# Patient Record
Sex: FEMALE | Race: WHITE | NOT HISPANIC OR LATINO | Employment: OTHER | URBAN - METROPOLITAN AREA
[De-identification: names, ages, dates, MRNs, and addresses within clinical notes are randomized per-mention and may not be internally consistent; named-entity substitution may affect disease eponyms.]

---

## 2023-10-03 ENCOUNTER — APPOINTMENT (EMERGENCY)
Dept: RADIOLOGY | Facility: HOSPITAL | Age: 73
End: 2023-10-03
Payer: MEDICARE

## 2023-10-03 ENCOUNTER — HOSPITAL ENCOUNTER (OUTPATIENT)
Facility: HOSPITAL | Age: 73
Setting detail: OBSERVATION
Discharge: HOME | End: 2023-10-04
Attending: EMERGENCY MEDICINE | Admitting: INTERNAL MEDICINE
Payer: MEDICARE

## 2023-10-03 DIAGNOSIS — J45.901 ASTHMA EXACERBATION: Primary | ICD-10-CM

## 2023-10-03 LAB
ALBUMIN SERPL BCP-MCNC: 3.4 G/DL (ref 3.5–5)
ALP SERPL-CCNC: 70 U/L (ref 34–104)
ALT SERPL W P-5'-P-CCNC: 16 U/L (ref 7–52)
ANION GAP SERPL CALCULATED.3IONS-SCNC: 8 MMOL/L
AST SERPL W P-5'-P-CCNC: 16 U/L (ref 13–39)
BASOPHILS # BLD AUTO: 0.04 THOUSANDS/ÂΜL (ref 0–0.1)
BASOPHILS NFR BLD AUTO: 1 % (ref 0–1)
BILIRUB SERPL-MCNC: 0.6 MG/DL (ref 0.2–1)
BUN SERPL-MCNC: 13 MG/DL (ref 5–25)
CALCIUM ALBUM COR SERPL-MCNC: 9.1 MG/DL (ref 8.3–10.1)
CALCIUM SERPL-MCNC: 8.6 MG/DL (ref 8.4–10.2)
CARDIAC TROPONIN I PNL SERPL HS: 7 NG/L
CHLORIDE SERPL-SCNC: 98 MMOL/L (ref 96–108)
CO2 SERPL-SCNC: 27 MMOL/L (ref 21–32)
CREAT SERPL-MCNC: 0.81 MG/DL (ref 0.6–1.3)
EOSINOPHIL # BLD AUTO: 0.22 THOUSAND/ÂΜL (ref 0–0.61)
EOSINOPHIL NFR BLD AUTO: 3 % (ref 0–6)
ERYTHROCYTE [DISTWIDTH] IN BLOOD BY AUTOMATED COUNT: 13.1 % (ref 11.6–15.1)
GFR SERPL CREATININE-BSD FRML MDRD: 72 ML/MIN/1.73SQ M
GLUCOSE SERPL-MCNC: 120 MG/DL (ref 65–140)
HCT VFR BLD AUTO: 33.3 % (ref 34.8–46.1)
HGB BLD-MCNC: 10.8 G/DL (ref 11.5–15.4)
IMM GRANULOCYTES # BLD AUTO: 0.07 THOUSAND/UL (ref 0–0.2)
IMM GRANULOCYTES NFR BLD AUTO: 1 % (ref 0–2)
LYMPHOCYTES # BLD AUTO: 1.29 THOUSANDS/ÂΜL (ref 0.6–4.47)
LYMPHOCYTES NFR BLD AUTO: 16 % (ref 14–44)
MCH RBC QN AUTO: 29.6 PG (ref 26.8–34.3)
MCHC RBC AUTO-ENTMCNC: 32.4 G/DL (ref 31.4–37.4)
MCV RBC AUTO: 91 FL (ref 82–98)
MONOCYTES # BLD AUTO: 0.69 THOUSAND/ÂΜL (ref 0.17–1.22)
MONOCYTES NFR BLD AUTO: 9 % (ref 4–12)
NEUTROPHILS # BLD AUTO: 5.6 THOUSANDS/ÂΜL (ref 1.85–7.62)
NEUTS SEG NFR BLD AUTO: 70 % (ref 43–75)
NRBC BLD AUTO-RTO: 0 /100 WBCS
PLATELET # BLD AUTO: 201 THOUSANDS/UL (ref 149–390)
PMV BLD AUTO: 9.6 FL (ref 8.9–12.7)
POTASSIUM SERPL-SCNC: 3.1 MMOL/L (ref 3.5–5.3)
PROT SERPL-MCNC: 6.6 G/DL (ref 6.4–8.4)
RBC # BLD AUTO: 3.65 MILLION/UL (ref 3.81–5.12)
SODIUM SERPL-SCNC: 133 MMOL/L (ref 135–147)
WBC # BLD AUTO: 7.91 THOUSAND/UL (ref 4.31–10.16)

## 2023-10-03 PROCEDURE — 36415 COLL VENOUS BLD VENIPUNCTURE: CPT | Performed by: EMERGENCY MEDICINE

## 2023-10-03 PROCEDURE — 85025 COMPLETE CBC W/AUTO DIFF WBC: CPT | Performed by: EMERGENCY MEDICINE

## 2023-10-03 PROCEDURE — 94644 CONT INHLJ TX 1ST HOUR: CPT

## 2023-10-03 PROCEDURE — 93005 ELECTROCARDIOGRAM TRACING: CPT

## 2023-10-03 PROCEDURE — 84484 ASSAY OF TROPONIN QUANT: CPT | Performed by: EMERGENCY MEDICINE

## 2023-10-03 PROCEDURE — 99285 EMERGENCY DEPT VISIT HI MDM: CPT

## 2023-10-03 PROCEDURE — 83735 ASSAY OF MAGNESIUM: CPT

## 2023-10-03 PROCEDURE — 99285 EMERGENCY DEPT VISIT HI MDM: CPT | Performed by: PHYSICIAN ASSISTANT

## 2023-10-03 PROCEDURE — 96375 TX/PRO/DX INJ NEW DRUG ADDON: CPT

## 2023-10-03 PROCEDURE — 80053 COMPREHEN METABOLIC PANEL: CPT | Performed by: EMERGENCY MEDICINE

## 2023-10-03 PROCEDURE — 71045 X-RAY EXAM CHEST 1 VIEW: CPT

## 2023-10-03 PROCEDURE — 96365 THER/PROPH/DIAG IV INF INIT: CPT

## 2023-10-03 RX ORDER — METHYLPREDNISOLONE SODIUM SUCCINATE 125 MG/2ML
125 INJECTION, POWDER, LYOPHILIZED, FOR SOLUTION INTRAMUSCULAR; INTRAVENOUS ONCE
Status: COMPLETED | OUTPATIENT
Start: 2023-10-03 | End: 2023-10-03

## 2023-10-03 RX ORDER — MAGNESIUM SULFATE HEPTAHYDRATE 40 MG/ML
2 INJECTION, SOLUTION INTRAVENOUS ONCE
Status: COMPLETED | OUTPATIENT
Start: 2023-10-03 | End: 2023-10-04

## 2023-10-03 RX ORDER — SODIUM CHLORIDE FOR INHALATION 0.9 %
3 VIAL, NEBULIZER (ML) INHALATION ONCE
Status: COMPLETED | OUTPATIENT
Start: 2023-10-03 | End: 2023-10-03

## 2023-10-03 RX ORDER — POTASSIUM CHLORIDE 20 MEQ/1
40 TABLET, EXTENDED RELEASE ORAL ONCE
Status: COMPLETED | OUTPATIENT
Start: 2023-10-03 | End: 2023-10-03

## 2023-10-03 RX ADMIN — MAGNESIUM SULFATE HEPTAHYDRATE 2 G: 40 INJECTION, SOLUTION INTRAVENOUS at 22:58

## 2023-10-03 RX ADMIN — ALBUTEROL SULFATE 10 MG: 2.5 SOLUTION RESPIRATORY (INHALATION) at 22:42

## 2023-10-03 RX ADMIN — Medication 3 ML: at 22:42

## 2023-10-03 RX ADMIN — METHYLPREDNISOLONE SODIUM SUCCINATE 125 MG: 125 INJECTION, POWDER, FOR SOLUTION INTRAMUSCULAR; INTRAVENOUS at 22:45

## 2023-10-03 RX ADMIN — POTASSIUM CHLORIDE 40 MEQ: 1500 TABLET, EXTENDED RELEASE ORAL at 22:40

## 2023-10-03 RX ADMIN — IPRATROPIUM BROMIDE 0.5 MG: 0.5 SOLUTION RESPIRATORY (INHALATION) at 22:42

## 2023-10-04 VITALS
BODY MASS INDEX: 45.84 KG/M2 | HEART RATE: 95 BPM | RESPIRATION RATE: 17 BRPM | SYSTOLIC BLOOD PRESSURE: 137 MMHG | DIASTOLIC BLOOD PRESSURE: 63 MMHG | TEMPERATURE: 97.9 F | OXYGEN SATURATION: 94 % | HEIGHT: 62 IN | WEIGHT: 249.12 LBS

## 2023-10-04 PROBLEM — G25.81 RLS (RESTLESS LEGS SYNDROME): Status: ACTIVE | Noted: 2023-10-04

## 2023-10-04 PROBLEM — Z59.00 HOMELESSNESS: Status: ACTIVE | Noted: 2023-10-04

## 2023-10-04 PROBLEM — J96.01 ACUTE RESPIRATORY FAILURE WITH HYPOXIA (HCC): Status: ACTIVE | Noted: 2023-10-04

## 2023-10-04 PROBLEM — E87.6 HYPOKALEMIA: Status: ACTIVE | Noted: 2023-10-04

## 2023-10-04 PROBLEM — J98.4 ACUTE PULMONARY INSUFFICIENCY: Status: ACTIVE | Noted: 2023-10-04

## 2023-10-04 LAB
2HR DELTA HS TROPONIN: -1 NG/L
4HR DELTA HS TROPONIN: 0 NG/L
ANION GAP SERPL CALCULATED.3IONS-SCNC: 11 MMOL/L
ATRIAL RATE: 89 BPM
ATRIAL RATE: 97 BPM
BUN SERPL-MCNC: 12 MG/DL (ref 5–25)
CALCIUM SERPL-MCNC: 8.7 MG/DL (ref 8.4–10.2)
CARDIAC TROPONIN I PNL SERPL HS: 6 NG/L
CARDIAC TROPONIN I PNL SERPL HS: 7 NG/L
CHLORIDE SERPL-SCNC: 100 MMOL/L (ref 96–108)
CO2 SERPL-SCNC: 25 MMOL/L (ref 21–32)
CREAT SERPL-MCNC: 0.81 MG/DL (ref 0.6–1.3)
ERYTHROCYTE [DISTWIDTH] IN BLOOD BY AUTOMATED COUNT: 13 % (ref 11.6–15.1)
FLUAV RNA RESP QL NAA+PROBE: NEGATIVE
FLUBV RNA RESP QL NAA+PROBE: NEGATIVE
GFR SERPL CREATININE-BSD FRML MDRD: 72 ML/MIN/1.73SQ M
GLUCOSE SERPL-MCNC: 174 MG/DL (ref 65–140)
GLUCOSE SERPL-MCNC: 187 MG/DL (ref 65–140)
GLUCOSE SERPL-MCNC: 195 MG/DL (ref 65–140)
GLUCOSE SERPL-MCNC: 205 MG/DL (ref 65–140)
HCT VFR BLD AUTO: 34.6 % (ref 34.8–46.1)
HGB BLD-MCNC: 11.1 G/DL (ref 11.5–15.4)
MAGNESIUM SERPL-MCNC: 1.9 MG/DL (ref 1.9–2.7)
MCH RBC QN AUTO: 29.5 PG (ref 26.8–34.3)
MCHC RBC AUTO-ENTMCNC: 32.1 G/DL (ref 31.4–37.4)
MCV RBC AUTO: 92 FL (ref 82–98)
P AXIS: 70 DEGREES
P AXIS: 71 DEGREES
PLATELET # BLD AUTO: 202 THOUSANDS/UL (ref 149–390)
PMV BLD AUTO: 9.9 FL (ref 8.9–12.7)
POTASSIUM SERPL-SCNC: 3.2 MMOL/L (ref 3.5–5.3)
PR INTERVAL: 128 MS
PR INTERVAL: 148 MS
QRS AXIS: 48 DEGREES
QRS AXIS: 61 DEGREES
QRSD INTERVAL: 88 MS
QRSD INTERVAL: 98 MS
QT INTERVAL: 366 MS
QT INTERVAL: 368 MS
QTC INTERVAL: 445 MS
QTC INTERVAL: 467 MS
RBC # BLD AUTO: 3.76 MILLION/UL (ref 3.81–5.12)
RSV RNA RESP QL NAA+PROBE: NEGATIVE
SARS-COV-2 RNA RESP QL NAA+PROBE: NEGATIVE
SODIUM SERPL-SCNC: 136 MMOL/L (ref 135–147)
T WAVE AXIS: 54 DEGREES
T WAVE AXIS: 64 DEGREES
VENTRICULAR RATE: 89 BPM
VENTRICULAR RATE: 97 BPM
WBC # BLD AUTO: 8.02 THOUSAND/UL (ref 4.31–10.16)

## 2023-10-04 PROCEDURE — 0241U HB NFCT DS VIR RESP RNA 4 TRGT: CPT

## 2023-10-04 PROCEDURE — 82948 REAGENT STRIP/BLOOD GLUCOSE: CPT

## 2023-10-04 PROCEDURE — G0008 ADMIN INFLUENZA VIRUS VAC: HCPCS | Performed by: STUDENT IN AN ORGANIZED HEALTH CARE EDUCATION/TRAINING PROGRAM

## 2023-10-04 PROCEDURE — 93005 ELECTROCARDIOGRAM TRACING: CPT

## 2023-10-04 PROCEDURE — 85027 COMPLETE CBC AUTOMATED: CPT | Performed by: STUDENT IN AN ORGANIZED HEALTH CARE EDUCATION/TRAINING PROGRAM

## 2023-10-04 PROCEDURE — 94760 N-INVAS EAR/PLS OXIMETRY 1: CPT

## 2023-10-04 PROCEDURE — 84484 ASSAY OF TROPONIN QUANT: CPT | Performed by: EMERGENCY MEDICINE

## 2023-10-04 PROCEDURE — 80048 BASIC METABOLIC PNL TOTAL CA: CPT | Performed by: STUDENT IN AN ORGANIZED HEALTH CARE EDUCATION/TRAINING PROGRAM

## 2023-10-04 PROCEDURE — 93010 ELECTROCARDIOGRAM REPORT: CPT | Performed by: INTERNAL MEDICINE

## 2023-10-04 PROCEDURE — 99236 HOSP IP/OBS SAME DATE HI 85: CPT | Performed by: INTERNAL MEDICINE

## 2023-10-04 PROCEDURE — 90662 IIV NO PRSV INCREASED AG IM: CPT | Performed by: STUDENT IN AN ORGANIZED HEALTH CARE EDUCATION/TRAINING PROGRAM

## 2023-10-04 PROCEDURE — 94640 AIRWAY INHALATION TREATMENT: CPT

## 2023-10-04 RX ORDER — TRAMADOL HYDROCHLORIDE 50 MG/1
50 TABLET ORAL 2 TIMES DAILY PRN
COMMUNITY
Start: 2023-08-18

## 2023-10-04 RX ORDER — VENLAFAXINE HYDROCHLORIDE 150 MG/1
1 CAPSULE, EXTENDED RELEASE ORAL DAILY
COMMUNITY
Start: 2023-06-15

## 2023-10-04 RX ORDER — GUAIFENESIN 100 MG/5ML
200 SOLUTION ORAL EVERY 4 HOURS PRN
Status: DISCONTINUED | OUTPATIENT
Start: 2023-10-04 | End: 2023-10-04 | Stop reason: HOSPADM

## 2023-10-04 RX ORDER — PANTOPRAZOLE SODIUM 20 MG/1
20 TABLET, DELAYED RELEASE ORAL 2 TIMES DAILY
COMMUNITY
Start: 2023-04-26 | End: 2024-04-25

## 2023-10-04 RX ORDER — ROPINIROLE 1 MG/1
1 TABLET, FILM COATED ORAL EVERY MORNING
Status: DISCONTINUED | OUTPATIENT
Start: 2023-10-04 | End: 2023-10-04 | Stop reason: HOSPADM

## 2023-10-04 RX ORDER — ALBUTEROL SULFATE 90 UG/1
2 AEROSOL, METERED RESPIRATORY (INHALATION) EVERY 6 HOURS PRN
Qty: 18 G | Refills: 0 | Status: SHIPPED | OUTPATIENT
Start: 2023-10-04

## 2023-10-04 RX ORDER — POTASSIUM CHLORIDE 20 MEQ/1
40 TABLET, EXTENDED RELEASE ORAL ONCE
Status: COMPLETED | OUTPATIENT
Start: 2023-10-04 | End: 2023-10-04

## 2023-10-04 RX ORDER — GUAIFENESIN AND CODEINE PHOSPHATE 100; 10 MG/5ML; MG/5ML
5 SOLUTION ORAL 3 TIMES DAILY PRN
Qty: 120 ML | Refills: 0 | Status: SHIPPED | OUTPATIENT
Start: 2023-10-04

## 2023-10-04 RX ORDER — ROPINIROLE 1 MG/1
TABLET, FILM COATED ORAL
COMMUNITY
Start: 2023-05-10

## 2023-10-04 RX ORDER — METHYLPREDNISOLONE SODIUM SUCCINATE 40 MG/ML
40 INJECTION, POWDER, LYOPHILIZED, FOR SOLUTION INTRAMUSCULAR; INTRAVENOUS EVERY 8 HOURS
Status: DISCONTINUED | OUTPATIENT
Start: 2023-10-04 | End: 2023-10-04 | Stop reason: HOSPADM

## 2023-10-04 RX ORDER — ENOXAPARIN SODIUM 100 MG/ML
40 INJECTION SUBCUTANEOUS DAILY
Status: DISCONTINUED | OUTPATIENT
Start: 2023-10-04 | End: 2023-10-04 | Stop reason: HOSPADM

## 2023-10-04 RX ORDER — PANTOPRAZOLE SODIUM 20 MG/1
20 TABLET, DELAYED RELEASE ORAL 2 TIMES DAILY
Status: DISCONTINUED | OUTPATIENT
Start: 2023-10-04 | End: 2023-10-04 | Stop reason: HOSPADM

## 2023-10-04 RX ORDER — LEVALBUTEROL INHALATION SOLUTION 1.25 MG/3ML
1.25 SOLUTION RESPIRATORY (INHALATION) EVERY 6 HOURS PRN
Status: DISCONTINUED | OUTPATIENT
Start: 2023-10-04 | End: 2023-10-04 | Stop reason: HOSPADM

## 2023-10-04 RX ORDER — HYDROCHLOROTHIAZIDE 12.5 MG/1
12.5 TABLET ORAL DAILY
Status: DISCONTINUED | OUTPATIENT
Start: 2023-10-04 | End: 2023-10-04 | Stop reason: HOSPADM

## 2023-10-04 RX ORDER — INSULIN LISPRO 100 [IU]/ML
1-6 INJECTION, SOLUTION INTRAVENOUS; SUBCUTANEOUS
Status: DISCONTINUED | OUTPATIENT
Start: 2023-10-04 | End: 2023-10-04 | Stop reason: HOSPADM

## 2023-10-04 RX ORDER — IPRATROPIUM BROMIDE AND ALBUTEROL SULFATE 2.5; .5 MG/3ML; MG/3ML
3 SOLUTION RESPIRATORY (INHALATION)
Status: DISCONTINUED | OUTPATIENT
Start: 2023-10-04 | End: 2023-10-04 | Stop reason: HOSPADM

## 2023-10-04 RX ORDER — SODIUM CHLORIDE FOR INHALATION 0.9 %
3 VIAL, NEBULIZER (ML) INHALATION EVERY 6 HOURS PRN
Status: DISCONTINUED | OUTPATIENT
Start: 2023-10-04 | End: 2023-10-04 | Stop reason: HOSPADM

## 2023-10-04 RX ORDER — MAGNESIUM HYDROXIDE/ALUMINUM HYDROXICE/SIMETHICONE 120; 1200; 1200 MG/30ML; MG/30ML; MG/30ML
30 SUSPENSION ORAL EVERY 6 HOURS PRN
Status: DISCONTINUED | OUTPATIENT
Start: 2023-10-04 | End: 2023-10-04 | Stop reason: HOSPADM

## 2023-10-04 RX ORDER — POLYETHYLENE GLYCOL 3350 17 G/17G
17 POWDER, FOR SOLUTION ORAL DAILY
Status: DISCONTINUED | OUTPATIENT
Start: 2023-10-04 | End: 2023-10-04 | Stop reason: HOSPADM

## 2023-10-04 RX ORDER — ALBUTEROL SULFATE 2.5 MG/3ML
2.5 SOLUTION RESPIRATORY (INHALATION) EVERY 4 HOURS PRN
COMMUNITY
Start: 2023-02-03 | End: 2024-02-03

## 2023-10-04 RX ORDER — HYDROCHLOROTHIAZIDE 12.5 MG/1
12.5 TABLET ORAL DAILY
COMMUNITY
Start: 2023-07-07

## 2023-10-04 RX ORDER — ACETAMINOPHEN 325 MG/1
650 TABLET ORAL EVERY 6 HOURS PRN
Status: DISCONTINUED | OUTPATIENT
Start: 2023-10-04 | End: 2023-10-04 | Stop reason: HOSPADM

## 2023-10-04 RX ORDER — CLONAZEPAM 0.5 MG/1
0.5 TABLET ORAL DAILY PRN
COMMUNITY
Start: 2023-08-18

## 2023-10-04 RX ORDER — VENLAFAXINE HYDROCHLORIDE 150 MG/1
150 CAPSULE, EXTENDED RELEASE ORAL DAILY
Status: DISCONTINUED | OUTPATIENT
Start: 2023-10-04 | End: 2023-10-04 | Stop reason: HOSPADM

## 2023-10-04 RX ORDER — PREDNISONE 20 MG/1
20 TABLET ORAL DAILY
Qty: 5 TABLET | Refills: 0 | Status: SHIPPED | OUTPATIENT
Start: 2023-10-04 | End: 2023-10-09

## 2023-10-04 RX ADMIN — ENOXAPARIN SODIUM 40 MG: 40 INJECTION SUBCUTANEOUS at 08:05

## 2023-10-04 RX ADMIN — IPRATROPIUM BROMIDE AND ALBUTEROL SULFATE 3 ML: 2.5; .5 SOLUTION RESPIRATORY (INHALATION) at 07:04

## 2023-10-04 RX ADMIN — HYDROCHLOROTHIAZIDE 12.5 MG: 12.5 TABLET ORAL at 08:05

## 2023-10-04 RX ADMIN — INSULIN LISPRO 1 UNITS: 100 INJECTION, SOLUTION INTRAVENOUS; SUBCUTANEOUS at 16:19

## 2023-10-04 RX ADMIN — METHYLPREDNISOLONE SODIUM SUCCINATE 40 MG: 40 INJECTION, POWDER, FOR SOLUTION INTRAMUSCULAR; INTRAVENOUS at 08:04

## 2023-10-04 RX ADMIN — NICOTINE 7 MG/24 HR DAILY TRANSDERMAL PATCH 1 PATCH: at 08:05

## 2023-10-04 RX ADMIN — IPRATROPIUM BROMIDE AND ALBUTEROL SULFATE 3 ML: 2.5; .5 SOLUTION RESPIRATORY (INHALATION) at 13:30

## 2023-10-04 RX ADMIN — INSULIN LISPRO 2 UNITS: 100 INJECTION, SOLUTION INTRAVENOUS; SUBCUTANEOUS at 11:30

## 2023-10-04 RX ADMIN — VENLAFAXINE HYDROCHLORIDE 150 MG: 150 CAPSULE, EXTENDED RELEASE ORAL at 08:06

## 2023-10-04 RX ADMIN — PANTOPRAZOLE SODIUM 20 MG: 20 TABLET, DELAYED RELEASE ORAL at 06:09

## 2023-10-04 RX ADMIN — METHYLPREDNISOLONE SODIUM SUCCINATE 40 MG: 40 INJECTION, POWDER, FOR SOLUTION INTRAMUSCULAR; INTRAVENOUS at 16:19

## 2023-10-04 RX ADMIN — PANTOPRAZOLE SODIUM 20 MG: 20 TABLET, DELAYED RELEASE ORAL at 17:44

## 2023-10-04 RX ADMIN — POTASSIUM CHLORIDE 40 MEQ: 1500 TABLET, EXTENDED RELEASE ORAL at 08:06

## 2023-10-04 RX ADMIN — INSULIN LISPRO 1 UNITS: 100 INJECTION, SOLUTION INTRAVENOUS; SUBCUTANEOUS at 08:07

## 2023-10-04 RX ADMIN — INFLUENZA A VIRUS A/VICTORIA/4897/2022 IVR-238 (H1N1) ANTIGEN (FORMALDEHYDE INACTIVATED), INFLUENZA A VIRUS A/DARWIN/9/2021 SAN-010 (H3N2) ANTIGEN (FORMALDEHYDE INACTIVATED), INFLUENZA B VIRUS B/PHUKET/3073/2013 ANTIGEN (FORMALDEHYDE INACTIVATED), AND INFLUENZA B VIRUS B/MICHIGAN/01/2021 ANTIGEN (FORMALDEHYDE INACTIVATED) 0.7 ML: 60; 60; 60; 60 INJECTION, SUSPENSION INTRAMUSCULAR at 03:16

## 2023-10-04 RX ADMIN — ROPINIROLE 1 MG: 1 TABLET, FILM COATED ORAL at 08:05

## 2023-10-04 NOTE — CASE MANAGEMENT
Case Management Assessment & Discharge Planning Note    Patient name Lonza Prows  Location [place] [address] [address] MRN [id number]  : 1950 Date 10/4/2023       Current Admission Date: 10/3/2023  Current Admission Diagnosis:Acute respiratory failure with hypoxia [facility])   Patient Active Problem List    Diagnosis Date Noted   • Acute respiratory failure with hypoxia ([address]) 10/04/2023   • Essential hypertension 2013   • Asthma 2010   • Anxiety and depression 2009      LOS (days): 0  Geometric Mean LOS (GMLOS) (days):   Days to GMLOS:     OBJECTIVE:              Current admission status: Observation       Preferred Pharmacy:   UNKNOWN - FOLLOW UP PRIOR TO DISCHARGE TO E-PRESCRIBE  No address on file      Primary Care Provider: No primary care provider on file. Primary Insurance: MEDICARE  Secondary Insurance: AARP    ASSESSMENT:  Active Health Care Proxies    There are no active Health Care Proxies on file.        Advance Directives  Primary Contact: gabi Reyes [phone]    Readmission Root Cause  30 Day Readmission: No    Patient Information  Admitted from[de-identified] Other (comment) (homeless)  Mental Status: Alert  During Assessment patient was accompanied by: Not accompanied during assessment  Assessment information provided by[de-identified] Patient  Primary Caregiver: Self  Support Systems: Self, Family members  [facility]: [address] [place] [address] do you live in?: [facility]  Type of Current Residence: Homeless  Homeless/housing insecurity resource given?: N/A  Living Arrangements: Lives w/ Extended Family (homless living w/ neice)    Activities of Daily Living Prior to Admission  Functional Status: Independent  Completes ADLs independently?: Yes  Ambulates independently?: Yes  Does patient use assisted devices?: Yes  Assisted Devices (DME) used: Straight Cane  Does patient currently own DME?: Yes  What DME does the patient currently own?: [employer] Carter    Patient Information Continued  Income Source: Pension/nursing home (Pension $610.00& SSI $7057.21)  Does patient have prescription coverage?: Yes  Does patient have a history of substance abuse?: No  Does patient have a history of Mental Health Diagnosis?: Yes (anxiety & depression)    Means of Transportation  Means of Transport to Appts[de-identified] Drives Self (Lives in her Car)        DISCHARGE DETAILS:    Discharge planning discussed with[de-identified] patient       CM contacted family/caregiver?: No- see comments    Contacts  Patient Contacts: Amy  Relationship to Patient[de-identified] Family  Contact Method: Phone  Phone Number: [phone]  Reason/Outcome: Emergency Contact    Additional Comments: P:atient is homeless living in her care w/ her neice. Uses a cane owns a RW. Patient refuing long term care placement, wants to live in an apt w/ her neice. CM provided homeless resources packet. Patient's PCP is w/ [facility] CM unable to get patient a  w/ Landon Brian CM left voicemail for [facility]. Patient has an income, details listed above.

## 2023-10-04 NOTE — H&P
[facility]  H&P  Name: Leeanna Arthur 68 y.o. female I MRN: [id number]  Unit/Bed#: E5 -01 I Date of Admission: 10/3/2023   Date of Service: 10/4/2023 I Hospital Day: 0      Assessment/Plan   Essential hypertension  Assessment & Plan  Hypertensive in setting of anxiety    Plan:  • Continue PTA hctz 12.5mg daily       Asthma  Assessment & Plan  Reports asthma since a child, previously on long-acting inhalers but stopped herself. Various allergens to dust/pollen. No clear exacerbation this admission. Hypoxic   CBC without leukocytosis, viral panel negative  CXR without signs of pulmonary edema, gross consolidation    Plan:  • Steroid: solumedrol load in ED, continue q8h  • Inhaler: continue scheduled duonebs, prn levalbuterol. Would benefit from long-acting   • Respiratory hygiene, expectorants, wean oxygen as able, tobacco cessation      Anxiety and depression  Assessment & Plan  Mood stable    Plan:  • Continue PTA Effexor, Requip       * Acute respiratory failure with hypoxia (HCC)  Assessment & Plan  In setting of asthma/copd exacerbation     Plan:  • Respiratory hygiene, continuous pulse ox   • Wean oxygen as able       VTE Pharmacologic Prophylaxis: VTE Score: 5 High Risk (Score >/= 5) - Pharmacological DVT Prophylaxis Ordered: enoxaparin (Lovenox). Sequential Compression Devices Ordered. Code Status: Level 1 - Full Code   Discussion with family: update in AM.     Anticipated Length of Stay: Patient will be admitted on an observation basis with an anticipated length of stay of less than 2 midnights secondary to ashtma. Total Time Spent on Date of Encounter in care of patient:  mins.  This time was spent on one or more of the following: performing physical exam; counseling and coordination of care; obtaining or reviewing history; documenting in the medical record; reviewing/ordering tests, medications or procedures; communicating with other healthcare professionals and discussing with patient's family/caregivers. Chief Complaint: shortness of breath    History of Present Illness:  Teri Boogie is a 68 y.o. female with a PMH of HTN, asthma, tobacco use, anxiety who presents with shortness of breaht. History obtained from patient, chart review and discussion with ED PA. She presents tonight for evaluation of shortness of breath for about a week. Reports URI symptoms like runny nose, congestion, wet cough with her shortness of breath. Her chest feels tight and she is wheezing. No fevers/chills. Denies known sick contacts. Tried using her home albuterol without relief. She reports having allergic asthma since a child but never has been hospitalized. Review of Systems:  Review of Systems   Constitutional: Positive for fatigue. Negative for chills and fever. Respiratory: Positive for chest tightness, shortness of breath and wheezing. Cardiovascular: Negative for chest pain and palpitations. Gastrointestinal: Negative for abdominal pain, diarrhea, nausea and vomiting. Neurological: Negative for syncope. All other systems reviewed and are negative. Past Medical and Surgical History:   History reviewed. No pertinent past medical history. History reviewed. No pertinent surgical history. Meds/Allergies:  Prior to Admission medications    Not on File     I have reviewed home medications with patient personally.     Allergies: No Known Allergies    Social History:  Marital Status: Single   Occupation:   Patient Pre-hospital Living Situation: Home  Patient Pre-hospital Level of Mobility: walks  Patient Pre-hospital Diet Restrictions:   Substance Use History:   Social History     Substance and Sexual Activity   Alcohol Use Yes    Comment: 1 a month     Social History     Tobacco Use   Smoking Status Every Day   • Packs/day: 0.75   • Types: Cigarettes   Smokeless Tobacco Not on file     Social History     Substance and Sexual Activity   Drug Use Never       Family History:  History reviewed. No pertinent family history. Physical Exam:     Vitals:   Blood Pressure: 131/62 (10/04/23 0147)  Pulse: 90 (10/04/23 0147)  Temperature: 98.6 °F (37 °C) (10/04/23 0147)  Temp Source: Oral (10/04/23 0147)  Respirations: 18 (10/04/23 0147)  Height: 5' 2" (157.5 cm) (10/04/23 0147)  Weight - Scale: 113 kg (249 lb 1.9 oz) (10/04/23 0147)  SpO2: 95 % (10/04/23 0147)    Physical Exam  Vitals and nursing note reviewed. Constitutional:       General: She is not in acute distress. Appearance: She is well-developed. HENT:      Head: Normocephalic and atraumatic. Eyes:      Conjunctiva/sclera: Conjunctivae normal.   Cardiovascular:      Rate and Rhythm: Normal rate and regular rhythm. Heart sounds: No murmur heard. Pulmonary:      Effort: Pulmonary effort is normal. No respiratory distress. Breath sounds: Wheezing (bilateral expiratory) present. Abdominal:      Palpations: Abdomen is soft. Tenderness: There is no abdominal tenderness. Musculoskeletal:         General: No swelling. Cervical back: Neck supple. Right lower leg: No edema. Left lower leg: No edema. Skin:     General: Skin is warm and dry. Capillary Refill: Capillary refill takes 2 to 3 seconds. Neurological:      Mental Status: She is alert and oriented to person, place, and time.    Psychiatric:         Mood and Affect: Mood normal.          Additional Data:     Lab Results:  Results from last 7 days   Lab Units 10/03/23  2212   WBC Thousand/uL 7.91   HEMOGLOBIN g/dL 10.8*   HEMATOCRIT % 33.3*   PLATELETS Thousands/uL 201   NEUTROS PCT % 70   LYMPHS PCT % 16   MONOS PCT % 9   EOS PCT % 3     Results from last 7 days   Lab Units 10/03/23  2212   SODIUM mmol/L 133*   POTASSIUM mmol/L 3.1*   CHLORIDE mmol/L 98   CO2 mmol/L 27   BUN mg/dL 13   CREATININE mg/dL 0.81   ANION GAP mmol/L 8   CALCIUM mg/dL 8.6   ALBUMIN g/dL 3.4*   TOTAL BILIRUBIN mg/dL 0.60   ALK PHOS U/L 70   ALT U/L 16 AST U/L 16   GLUCOSE RANDOM mg/dL 120                       Lines/Drains:  Invasive Devices     Peripheral Intravenous Line  Duration           Peripheral IV 10/03/23 Right Antecubital <1 day                    Imaging: Reviewed radiology reports from this admission including: chest xray and Personally reviewed the following imaging: chest xray  XR chest 1 view portable    (Results Pending)       EKG and Other Studies Reviewed on Admission:   · EKG: Sinus Tachycardia. HR 97.    ** Please Note: This note has been constructed using a voice recognition system.  **

## 2023-10-04 NOTE — ASSESSMENT & PLAN NOTE
Reports asthma since a child, previously on long-acting inhalers but stopped herself. Various allergens to dust/pollen. No clear exacerbation this admission. Hypoxic   CBC without leukocytosis, viral panel negative  CXR without signs of pulmonary edema, gross consolidation    Plan:  • Steroid: solumedrol load in ED, continue q8h  • Inhaler: continue scheduled duonebs, prn levalbuterol.  Would benefit from long-acting   • Respiratory hygiene, expectorants, wean oxygen as able, tobacco cessation

## 2023-10-04 NOTE — ED PROVIDER NOTES
History  Chief Complaint   Patient presents with   • Shortness of Breath     Pt c/o of SOB for 5 day. Pt having an increase in fatigue and a cough. No fevers     This is a 78-year-old female who reports history of asthma and HTN presenting to the ED for evaluation of SOB x 5d. Patient reports worsening symptoms today. Using inhalers at home without improvement of symptoms. She denies using oxygen at home for any reason. She endorses increased fatigue and cough over the past several days. She denies any chest pain or lightheadedness. She did not endorses chest tightness however. She denies any nausea or vomiting, no diarrhea or constipation. She denies any fevers or chills. She has been using      History provided by:  Patient   used: No        None       History reviewed. No pertinent past medical history. History reviewed. No pertinent surgical history. History reviewed. No pertinent family history. I have reviewed and agree with the history as documented.     E-Cigarette/Vaping     E-Cigarette/Vaping Substances     Social History     Tobacco Use   • Smoking status: Every Day     Packs/day: 0.75     Types: Cigarettes   Substance Use Topics   • Alcohol use: Yes     Comment: 1 a month   • Drug use: Never       Review of Systems       Physical Exam  Physical Exam    Vital Signs  ED Triage Vitals   Temperature Pulse Respirations Blood Pressure SpO2   10/03/23 2155 10/03/23 2155 10/03/23 2155 10/03/23 2158 10/03/23 2155   99.4 °F (37.4 °C) 92 20 130/60 94 %      Temp src Heart Rate Source Patient Position - Orthostatic VS BP Location FiO2 (%)   -- -- 10/03/23 2158 10/03/23 2158 --     Lying Right arm       Pain Score       10/03/23 2155       4           Vitals:    10/03/23 2155 10/03/23 2158 10/04/23 0000   BP:  130/60 134/73   Pulse: 92  98   Patient Position - Orthostatic VS:  Lying Lying         Visual Acuity      ED Medications  Medications   albuterol inhalation solution 10 mg (10 mg Nebulization Given 10/3/23 2242)     And   ipratropium (ATROVENT) 0.02 % inhalation solution 0.5 mg (0.5 mg Nebulization Given 10/3/23 2242)     And   sodium chloride 0.9 % inhalation solution 3 mL (3 mL Nebulization Given 10/3/23 2242)   methylPREDNISolone sodium succinate (Solu-MEDROL) injection 125 mg (125 mg Intravenous Given 10/3/23 2245)   magnesium sulfate 2 g/50 mL IVPB (premix) 2 g (2 g Intravenous New Bag 10/3/23 2258)   potassium chloride (K-DUR,KLOR-CON) CR tablet 40 mEq (40 mEq Oral Given 10/3/23 2240)       Diagnostic Studies  Results Reviewed     Procedure Component Value Units Date/Time    HS Troponin I 4hr [[id number]]     Lab Status: No result Specimen: Blood     HS Troponin 0hr (reflex protocol) [[id number]]  (Normal) Collected: 10/03/23 2212    Lab Status: Final result Specimen: Blood from Arm, Right Updated: 10/03/23 2243     hs TnI 0hr 7 ng/L     HS Troponin I 2hr [[id number]]     Lab Status: No result Specimen: Blood     Comprehensive metabolic panel [[id number]]  (Abnormal) Collected: 10/03/23 2212    Lab Status: Final result Specimen: Blood from Arm, Right Updated: 10/03/23 2235     Sodium 133 mmol/L      Potassium 3.1 mmol/L      Chloride 98 mmol/L      CO2 27 mmol/L      ANION GAP 8 mmol/L      BUN 13 mg/dL      Creatinine 0.81 mg/dL      Glucose 120 mg/dL      Calcium 8.6 mg/dL      Corrected Calcium 9.1 mg/dL      AST 16 U/L      ALT 16 U/L      Alkaline Phosphatase 70 U/L      Total Protein 6.6 g/dL      Albumin 3.4 g/dL      Total Bilirubin 0.60 mg/dL      eGFR 72 ml/min/1.73sq m     Narrative:      [place] guidelines for Chronic Kidney Disease (CKD):   •  Stage 1 with normal or high GFR (GFR > 90 mL/min/1.73 square meters)  •  Stage 2 Mild CKD (GFR = 60-89 mL/min/1.73 square meters)  •  Stage 3A Moderate CKD (GFR = 45-59 mL/min/1.73 square meters)  •  Stage 3B Moderate CKD (GFR = 30-44 mL/min/1.73 square meters)  •  Stage 4 Severe CKD (GFR = 15-29 mL/min/1.73 square meters)  •  Stage 5 End Stage CKD (GFR <15 mL/min/1.73 square meters)  Note: GFR calculation is accurate only with a steady state creatinine    CBC and differential [[id number]]  (Abnormal) Collected: 10/03/23 2212    Lab Status: Final result Specimen: Blood from Arm, Right Updated: 10/03/23 2223     WBC 7.91 Thousand/uL      RBC 3.65 Million/uL      Hemoglobin 10.8 g/dL      Hematocrit 33.3 %      MCV 91 fL      MCH 29.6 pg      MCHC 32.4 g/dL      RDW 13.1 %      MPV 9.6 fL      Platelets 047 Thousands/uL      nRBC 0 /100 WBCs      Neutrophils Relative 70 %      Immat GRANS % 1 %      Lymphocytes Relative 16 %      Monocytes Relative 9 %      Eosinophils Relative 3 %      Basophils Relative 1 %      Neutrophils Absolute 5.60 Thousands/µL      Immature Grans Absolute 0.07 Thousand/uL      Lymphocytes Absolute 1.29 Thousands/µL      Monocytes Absolute 0.69 Thousand/µL      Eosinophils Absolute 0.22 Thousand/µL      Basophils Absolute 0.04 Thousands/µL                  XR chest 1 view portable    (Results Pending)              Procedures  ECG 12 Lead Documentation Only    Date/Time: 10/3/2023 10:15 PM    Performed by: Yamilet Centeno PA-C  Authorized by: Yamilet Centeno PA-C    Indications / Diagnosis:  Shortness of breath  ECG reviewed by me, the ED Provider: yes (Also reviewed by Dr. Carlisle Ganser)    Patient location:  ED  Previous ECG:     Previous ECG:  Compared to current    Comparison to cardiac monitor: No    Interpretation:     Interpretation: normal    Quality:     Tracing quality:  Limited by artifact  Rate:     ECG rate:  89    ECG rate assessment: normal    Rhythm:     Rhythm: sinus rhythm    Ectopy:     Ectopy: none    QRS:     QRS axis:  Normal    QRS intervals:  Normal  Conduction:     Conduction: normal    ST segments:     ST segments:  Normal  T waves:     T waves: normal               ED Course  ED Course as of 10/04/23 0021   Tue Oct 03, 2023   2243 hs TnI 0hr: 7 SBIRT 22yo+    Flowsheet Row Most Recent Value   Initial Alcohol Screen: US AUDIT-C     1. How often do you have a drink containing alcohol? 2 Filed at: 10/03/2023 2159   2. How many drinks containing alcohol do you have on a typical day you are drinking? 1 Filed at: 10/03/2023 2159   3a. Male UNDER 65: How often do you have five or more drinks on one occasion? 0 Filed at: 10/03/2023 2159   3b. FEMALE Any Age, or MALE 65+: How often do you have 4 or more drinks on one occassion? 0 Filed at: 10/03/2023 2159   Audit-C Score 3 Filed at: 10/03/2023 2159   EL: How many times in the past year have you. .. Used an illegal drug or used a prescription medication for non-medical reasons? Never Filed at: 10/03/2023 2159                    MDM    Disposition  Final diagnoses:   None     ED Disposition     None      Follow-up Information    None         Patient's Medications    No medications on file       No discharge procedures on file.     PDMP Review     None          ED Provider  Electronically Signed by

## 2023-10-04 NOTE — PLAN OF CARE
Problem: Potential for Falls  Goal: Patient will remain free of falls  Description: INTERVENTIONS:  - Educate patient/family on patient safety including physical limitations  - Instruct patient to call for assistance with activity   - Consult OT/PT to assist with strengthening/mobility   - Keep Call bell within reach  - Keep bed low and locked with side rails adjusted as appropriate  - Keep care items and personal belongings within reach  - Initiate and maintain comfort rounds  - Make Fall Risk Sign visible to staff  - Offer Toileting every 2 Hours, in advance of need  - Initiate/Maintain bed  alarm  - Apply yellow socks and bracelet for high fall risk patients  - Consider moving patient to room near nurses station  Outcome: Progressing     Problem: MOBILITY - ADULT  Goal: Maintain or return to baseline ADL function  Description: INTERVENTIONS:  -  Assess patient's ability to carry out ADLs; assess patient's baseline for ADL function and identify physical deficits which impact ability to perform ADLs (bathing, care of mouth/teeth, toileting, grooming, dressing, etc.)  - Assess/evaluate cause of self-care deficits   - Assess range of motion  - Assess patient's mobility; develop plan if impaired  - Assess patient's need for assistive devices and provide as appropriate  - Encourage maximum independence but intervene and supervise when necessary  - Involve family in performance of ADLs  - Assess for home care needs following discharge   - Consider OT consult to assist with ADL evaluation and planning for discharge  - Provide patient education as appropriate  Outcome: Progressing  Goal: Maintains/Returns to pre admission functional level  Description: INTERVENTIONS:  - Perform BMAT or MOVE assessment daily.   - Set and communicate daily mobility goal to care team and patient/family/caregiver. - Collaborate with rehabilitation services on mobility goals if consulted  - Perform Range of Motion 3 times a day.   - Reposition patient every 2 hours.   - Dangle patient 3 times a day  - Stand patient 3 times a day  - Ambulate patient 3 times a day  - Out of bed to chair 3 times a day   - Out of bed for meals 3 times a day  - Out of bed for toileting  - Record patient progress and toleration of activity level   Outcome: Progressing     Problem: DISCHARGE PLANNING  Goal: Discharge to home or other facility with appropriate resources  Description: INTERVENTIONS:  - Identify barriers to discharge w/patient and caregiver  - Arrange for needed discharge resources and transportation as appropriate  - Identify discharge learning needs (meds, wound care, etc.)  - Arrange for interpretive services to assist at discharge as needed  - Refer to Case Management Department for coordinating discharge planning if the patient needs post-hospital services based on physician/advanced practitioner order or complex needs related to functional status, cognitive ability, or social support system  Outcome: Progressing     Problem: RESPIRATORY - ADULT  Goal: Achieves optimal ventilation and oxygenation  Description: INTERVENTIONS:  - Assess for changes in respiratory status  - Assess for changes in mentation and behavior  - Position to facilitate oxygenation and minimize respiratory effort  - Oxygen administered by appropriate delivery if ordered  - Initiate smoking cessation education as indicated  - Encourage broncho-pulmonary hygiene including cough, deep breathe, Incentive Spirometry  - Assess the need for suctioning and aspirate as needed  - Assess and instruct to report SOB or any respiratory difficulty  - Respiratory Therapy support as indicated  Outcome: Progressing     Problem: GENITOURINARY - ADULT  Goal: Maintains or returns to baseline urinary function  Description: INTERVENTIONS:  - Assess urinary function  - Encourage oral fluids to ensure adequate hydration if ordered  - Administer IV fluids as ordered to ensure adequate hydration  - Administer ordered medications as needed  - Offer frequent toileting  - Follow urinary retention protocol if ordered  Outcome: Progressing  Goal: Absence of urinary retention  Description: INTERVENTIONS:  - Assess patient's ability to void and empty bladder  - Monitor I/O  - Bladder scan as needed  - Discuss with physician/AP medications to alleviate retention as needed  - Discuss catheterization for long term situations as appropriate  Outcome: Progressing  Goal: Urinary catheter remains patent  Description: INTERVENTIONS:  - Assess patency of urinary catheter  - If patient has a chronic mercado, consider changing catheter if non-functioning  - Follow guidelines for intermittent irrigation of non-functioning urinary catheter  Outcome: Progressing     Problem: METABOLIC, FLUID AND ELECTROLYTES - ADULT  Goal: Electrolytes maintained within normal limits  Description: INTERVENTIONS:  - Monitor labs and assess patient for signs and symptoms of electrolyte imbalances  - Administer electrolyte replacement as ordered  - Monitor response to electrolyte replacements, including repeat lab results as appropriate  - Instruct patient on fluid and nutrition as appropriate  Outcome: Progressing  Goal: Fluid balance maintained  Description: INTERVENTIONS:  - Monitor labs   - Monitor I/O and WT  - Instruct patient on fluid and nutrition as appropriate  - Assess for signs & symptoms of volume excess or deficit  Outcome: Progressing  Goal: Glucose maintained within target range  Description: INTERVENTIONS:  - Monitor Blood Glucose as ordered  - Assess for signs and symptoms of hyperglycemia and hypoglycemia  - Administer ordered medications to maintain glucose within target range  - Assess nutritional intake and initiate nutrition service referral as needed  Outcome: Progressing     Problem: SKIN/TISSUE INTEGRITY - ADULT  Goal: Skin Integrity remains intact(Skin Breakdown Prevention)  Description: -Assess extremities for adequate circulation and sensation     Bed Management:  -Have minimal linens on bed & keep smooth, unwrinkled  -Change linens as needed when moist or perspiring  -Offer bedside commode    Activity:  -Encourage activity and walks on unit  -Encourage or provide ROM exercises   -Use appropriate equipment to lift or move patient in bed    Skin Care:  -Avoid use of baby powder, tape, friction and shearing, hot water or constrictive clothing  -Do not massage red bony areas    Outcome: Progressing     Problem: MUSCULOSKELETAL - ADULT  Goal: Maintain or return mobility to safest level of function  Description: INTERVENTIONS:  - Assess patient's ability to carry out ADLs; assess patient's baseline for ADL function and identify physical deficits which impact ability to perform ADLs (bathing, care of mouth/teeth, toileting, grooming, dressing, etc.)  - Assess/evaluate cause of self-care deficits   - Assess range of motion  - Assess patient's mobility  - Assess patient's need for assistive devices and provide as appropriate  - Encourage maximum independence but intervene and supervise when necessary  - Involve family in performance of ADLs  - Assess for home care needs following discharge   - Consider OT consult to assist with ADL evaluation and planning for discharge  - Provide patient education as appropriate  Outcome: Progressing  Goal: Maintain proper alignment of affected body part  Description: INTERVENTIONS:  - Support, maintain and protect limb and body alignment  - Provide patient/ family with appropriate education  Outcome: Progressing

## 2023-10-04 NOTE — PLAN OF CARE
Problem: Potential for Falls  Goal: Patient will remain free of falls  Description: INTERVENTIONS:  - Educate patient/family on patient safety including physical limitations  - Instruct patient to call for assistance with activity   - Consult OT/PT to assist with strengthening/mobility   - Keep Call bell within reach  - Keep bed low and locked with side rails adjusted as appropriate  - Keep care items and personal belongings within reach  - Initiate and maintain comfort rounds  - Make Fall Risk Sign visible to staff  - Offer Toileting every 2 Hours, in advance of need  - Initiate/Maintain bed  alarm  - Apply yellow socks and bracelet for high fall risk patients  - Consider moving patient to room near nurses station  10/4/2023 1739 by Claudetta Plump, RN  Outcome: Adequate for Discharge  10/4/2023 1012 by Claudetta Plump, RN  Outcome: Progressing     Problem: MOBILITY - ADULT  Goal: Maintain or return to baseline ADL function  Description: INTERVENTIONS:  -  Assess patient's ability to carry out ADLs; assess patient's baseline for ADL function and identify physical deficits which impact ability to perform ADLs (bathing, care of mouth/teeth, toileting, grooming, dressing, etc.)  - Assess/evaluate cause of self-care deficits   - Assess range of motion  - Assess patient's mobility; develop plan if impaired  - Assess patient's need for assistive devices and provide as appropriate  - Encourage maximum independence but intervene and supervise when necessary  - Involve family in performance of ADLs  - Assess for home care needs following discharge   - Consider OT consult to assist with ADL evaluation and planning for discharge  - Provide patient education as appropriate  10/4/2023 1739 by Claudetta Plump, RN  Outcome: Adequate for Discharge  10/4/2023 1012 by Claudetta Plump, RN  Outcome: Progressing  Goal: Maintains/Returns to pre admission functional level  Description: INTERVENTIONS:  - Perform BMAT or MOVE assessment daily.   - Set and communicate daily mobility goal to care team and patient/family/caregiver. - Collaborate with rehabilitation services on mobility goals if consulted  - Perform Range of Motion 3 times a day. - Reposition patient every 2 hours.   - Dangle patient 3 times a day  - Stand patient 3 times a day  - Ambulate patient 3 times a day  - Out of bed to chair 3 times a day   - Out of bed for meals 3 times a day  - Out of bed for toileting  - Record patient progress and toleration of activity level   10/4/2023 1739 by Andrés Ansari RN  Outcome: Adequate for Discharge  10/4/2023 1012 by Andrés Ansari RN  Outcome: Progressing     Problem: DISCHARGE PLANNING  Goal: Discharge to home or other facility with appropriate resources  Description: INTERVENTIONS:  - Identify barriers to discharge w/patient and caregiver  - Arrange for needed discharge resources and transportation as appropriate  - Identify discharge learning needs (meds, wound care, etc.)  - Arrange for interpretive services to assist at discharge as needed  - Refer to Case Management Department for coordinating discharge planning if the patient needs post-hospital services based on physician/advanced practitioner order or complex needs related to functional status, cognitive ability, or social support system  10/4/2023 1739 by Andrés Ansari RN  Outcome: Adequate for Discharge  10/4/2023 1012 by Andrés Ansari RN  Outcome: Progressing     Problem: RESPIRATORY - ADULT  Goal: Achieves optimal ventilation and oxygenation  Description: INTERVENTIONS:  - Assess for changes in respiratory status  - Assess for changes in mentation and behavior  - Position to facilitate oxygenation and minimize respiratory effort  - Oxygen administered by appropriate delivery if ordered  - Initiate smoking cessation education as indicated  - Encourage broncho-pulmonary hygiene including cough, deep breathe, Incentive Spirometry  - Assess the need for suctioning and aspirate as needed  - Assess and instruct to report SOB or any respiratory difficulty  - Respiratory Therapy support as indicated  10/4/2023 1739 by Rick Nesbitt RN  Outcome: Adequate for Discharge  10/4/2023 1012 by Rick Nesbitt RN  Outcome: Progressing     Problem: GENITOURINARY - ADULT  Goal: Maintains or returns to baseline urinary function  Description: INTERVENTIONS:  - Assess urinary function  - Encourage oral fluids to ensure adequate hydration if ordered  - Administer IV fluids as ordered to ensure adequate hydration  - Administer ordered medications as needed  - Offer frequent toileting  - Follow urinary retention protocol if ordered  10/4/2023 1739 by Rick Nesbitt RN  Outcome: Adequate for Discharge  10/4/2023 1012 by Rick Nesbitt RN  Outcome: Progressing  Goal: Absence of urinary retention  Description: INTERVENTIONS:  - Assess patient's ability to void and empty bladder  - Monitor I/O  - Bladder scan as needed  - Discuss with physician/AP medications to alleviate retention as needed  - Discuss catheterization for long term situations as appropriate  10/4/2023 1739 by Rick Nesbitt RN  Outcome: Adequate for Discharge  10/4/2023 1012 by Rick Nesbitt RN  Outcome: Progressing  Goal: Urinary catheter remains patent  Description: INTERVENTIONS:  - Assess patency of urinary catheter  - If patient has a chronic mercado, consider changing catheter if non-functioning  - Follow guidelines for intermittent irrigation of non-functioning urinary catheter  10/4/2023 1739 by Rick Nesbitt RN  Outcome: Adequate for Discharge  10/4/2023 1012 by Rick Nesbitt RN  Outcome: Progressing     Problem: METABOLIC, FLUID AND ELECTROLYTES - ADULT  Goal: Electrolytes maintained within normal limits  Description: INTERVENTIONS:  - Monitor labs and assess patient for signs and symptoms of electrolyte imbalances  - Administer electrolyte replacement as ordered  - Monitor response to electrolyte replacements, including repeat lab results as appropriate  - Instruct patient on fluid and nutrition as appropriate  10/4/2023 1739 by Carlos Humphreys RN  Outcome: Adequate for Discharge  10/4/2023 1012 by Carlos Humphreys RN  Outcome: Progressing  Goal: Fluid balance maintained  Description: INTERVENTIONS:  - Monitor labs   - Monitor I/O and WT  - Instruct patient on fluid and nutrition as appropriate  - Assess for signs & symptoms of volume excess or deficit  10/4/2023 1739 by Carlos Humphreys RN  Outcome: Adequate for Discharge  10/4/2023 1012 by Carlos Humphreys RN  Outcome: Progressing  Goal: Glucose maintained within target range  Description: INTERVENTIONS:  - Monitor Blood Glucose as ordered  - Assess for signs and symptoms of hyperglycemia and hypoglycemia  - Administer ordered medications to maintain glucose within target range  - Assess nutritional intake and initiate nutrition service referral as needed  10/4/2023 1739 by Carlos Humphreys RN  Outcome: Adequate for Discharge  10/4/2023 1012 by Carlos Humphreys RN  Outcome: Progressing     Problem: SKIN/TISSUE INTEGRITY - ADULT  Goal: Skin Integrity remains intact(Skin Breakdown Prevention)  Description: -Assess extremities for adequate circulation and sensation     Bed Management:  -Have minimal linens on bed & keep smooth, unwrinkled  -Change linens as needed when moist or perspiring  -Offer bedside commode    Activity:  -Encourage activity and walks on unit  -Encourage or provide ROM exercises   -Use appropriate equipment to lift or move patient in bed    Skin Care:  -Avoid use of baby powder, tape, friction and shearing, hot water or constrictive clothing  -Do not massage red bony areas    10/4/2023 1739 by Carlos Humphreys RN  Outcome: Adequate for Discharge  10/4/2023 1012 by Carlos Humphreys RN  Outcome: Progressing     Problem: MUSCULOSKELETAL - ADULT  Goal: Maintain or return mobility to safest level of function  Description: INTERVENTIONS:  - Assess patient's ability to carry out ADLs; assess patient's baseline for ADL function and identify physical deficits which impact ability to perform ADLs (bathing, care of mouth/teeth, toileting, grooming, dressing, etc.)  - Assess/evaluate cause of self-care deficits   - Assess range of motion  - Assess patient's mobility  - Assess patient's need for assistive devices and provide as appropriate  - Encourage maximum independence but intervene and supervise when necessary  - Involve family in performance of ADLs  - Assess for home care needs following discharge   - Consider OT consult to assist with ADL evaluation and planning for discharge  - Provide patient education as appropriate  10/4/2023 1739 by Von Ascencio RN  Outcome: Adequate for Discharge  10/4/2023 1012 by Von Ascencio RN  Outcome: Progressing  Goal: Maintain proper alignment of affected body part  Description: INTERVENTIONS:  - Support, maintain and protect limb and body alignment  - Provide patient/ family with appropriate education  10/4/2023 1739 by Von Ascencio RN  Outcome: Adequate for Discharge  10/4/2023 1012 by Von Ascencio RN  Outcome: Progressing

## 2023-10-04 NOTE — ASSESSMENT & PLAN NOTE
History of asthma hypertension and depression who presents with worsening shortness of breath found to have tracheobronchitis/asthma exacerbation  · Improved after IV methylprednisolone and nebulizer treatments  · CXR without evidence of infiltrate  · Weaned off of any supplemental oxygen  · Discharge: Guaifenesin/codeine 120 mL, prednisone, and albuterol

## 2023-10-04 NOTE — ED PROVIDER NOTES
History  Chief Complaint   Patient presents with   • Shortness of Breath     Pt c/o of SOB for 5 day. Pt having an increase in fatigue and a cough. No fevers     This is a 79-year-old female who reports history of asthma and HTN presenting to the ED for evaluation of SOB x 5d. Patient reports worsening symptoms today. Using inhalers at home without improvement of symptoms. She denies using oxygen at home for any reason. She endorses increased fatigue and cough over the past several days. She denies any chest pain or lightheadedness. She did not endorses chest tightness however. She denies any nausea or vomiting, no diarrhea or constipation. She denies any fevers or chills. She has been using over-the-counter cough medicine without improvement of symptoms.  used: No        Prior to Admission Medications   Prescriptions Last Dose Informant Patient Reported? Taking? albuterol (2.5 mg/3 mL) 0.083 % nebulizer solution Unknown  Yes No   Sig: Inhale 2.5 mg every 4 (four) hours as needed   clonazePAM (KlonoPIN) 0.5 mg tablet Unknown  Yes No   Sig: Take 0.5 mg by mouth daily as needed   hydrochlorothiazide (HYDRODIURIL) 12.5 mg tablet Past Week  Yes Yes   Sig: Take 12.5 mg by mouth daily   pantoprazole (PROTONIX) 20 mg tablet Past Week  Yes Yes   Sig: Take 20 mg by mouth 2 (two) times a day   rOPINIRole (REQUIP) 1 mg tablet Past Week  Yes Yes   Sig: TAKE ONE TABLET BY MOUTH TWICE A DAY AND TAKE TWO TABLETS BY MOUTH EVERY DAY AT BEDTIME   traMADol (ULTRAM) 50 mg tablet Unknown  Yes No   Sig: Take 50 mg by mouth 2 (two) times a day as needed   venlafaxine (EFFEXOR-XR) 150 mg 24 hr capsule Past Week  Yes Yes   Sig: Take 1 capsule by mouth daily      Facility-Administered Medications: None       History reviewed. No pertinent past medical history. History reviewed. No pertinent surgical history. History reviewed. No pertinent family history.   I have reviewed and agree with the history as documented. E-Cigarette/Vaping   • E-Cigarette Use Never User      E-Cigarette/Vaping Substances     Social History     Tobacco Use   • Smoking status: Every Day     Packs/day: 0.75     Types: Cigarettes   • Smokeless tobacco: Never   Vaping Use   • Vaping Use: Never used   Substance Use Topics   • Alcohol use: Yes     Comment: 1 a month   • Drug use: Never       Review of Systems   Constitutional: Negative for chills and fever. HENT: Positive for congestion. Respiratory: Positive for cough, chest tightness and shortness of breath. Cardiovascular: Negative for chest pain and palpitations. Gastrointestinal: Negative for nausea and vomiting. All other systems reviewed and are negative. Physical Exam  Physical Exam  Vitals reviewed. Constitutional:       General: She is not in acute distress. Appearance: Normal appearance. She is well-developed and well-groomed. She is not ill-appearing, toxic-appearing or diaphoretic. HENT:      Head: Normocephalic and atraumatic. Right Ear: External ear normal.      Left Ear: External ear normal.      Nose: Nose normal. No congestion or rhinorrhea. Mouth/Throat:      Mouth: Mucous membranes are moist.      Pharynx: Oropharynx is clear. No oropharyngeal exudate or posterior oropharyngeal erythema. Eyes:      General: No scleral icterus. Right eye: No discharge. Left eye: No discharge. Extraocular Movements: Extraocular movements intact. Conjunctiva/sclera: Conjunctivae normal.   Cardiovascular:      Rate and Rhythm: Normal rate and regular rhythm. Pulses: Normal pulses. Heart sounds: No murmur heard. No friction rub. No gallop. Pulmonary:      Effort: Pulmonary effort is normal. No respiratory distress. Breath sounds: Decreased air movement present. Wheezing (diffuse) present. No rhonchi or rales. Comments: Audible wheezing from the doorway. No respiratory distress.   Abdominal:      General: Abdomen is flat. There is no distension. Palpations: Abdomen is soft. Tenderness: There is no abdominal tenderness. There is no right CVA tenderness, left CVA tenderness, guarding or rebound. Musculoskeletal:         General: No deformity. Normal range of motion. Cervical back: Normal range of motion and neck supple. Skin:     General: Skin is warm and dry. Coloration: Skin is not jaundiced or pale. Findings: No rash. Neurological:      General: No focal deficit present. Mental Status: She is alert. Psychiatric:         Mood and Affect: Mood normal.         Behavior: Behavior normal. Behavior is cooperative.          Vital Signs  ED Triage Vitals   Temperature Pulse Respirations Blood Pressure SpO2   10/03/23 2155 10/03/23 2155 10/03/23 2155 10/03/23 2158 10/03/23 2155   99.4 °F (37.4 °C) 92 20 130/60 94 %      Temp Source Heart Rate Source Patient Position - Orthostatic VS BP Location FiO2 (%)   10/04/23 0147 10/04/23 0147 10/03/23 2158 10/03/23 2158 --   Oral Monitor Lying Right arm       Pain Score       10/03/23 2155       4           Vitals:    10/03/23 2158 10/04/23 0000 10/04/23 0100 10/04/23 0147   BP: 130/60 134/73 150/71 131/62   Pulse:  98 94 90   Patient Position - Orthostatic VS: Lying Lying  Lying         Visual Acuity      ED Medications  Medications   acetaminophen (TYLENOL) tablet 650 mg (has no administration in time range)   polyethylene glycol (MIRALAX) packet 17 g (has no administration in time range)   aluminum-magnesium hydroxide-simethicone (MAALOX) oral suspension 30 mL (has no administration in time range)   nicotine (NICODERM CQ) 7 mg/24hr TD 24 hr patch 1 patch (has no administration in time range)   guaiFENesin (ROBITUSSIN) oral liquid 200 mg (has no administration in time range)   levalbuterol (XOPENEX) inhalation solution 1.25 mg (has no administration in time range)     And   sodium chloride 0.9 % inhalation solution 3 mL (has no administration in time range)   ipratropium-albuterol (DUO-NEB) 0.5-2.5 mg/3 mL inhalation solution 3 mL (has no administration in time range)   methylPREDNISolone sodium succinate (Solu-MEDROL) injection 40 mg (has no administration in time range)   enoxaparin (LOVENOX) subcutaneous injection 40 mg (has no administration in time range)   insulin lispro (HumaLOG) 100 units/mL subcutaneous injection 1-6 Units (has no administration in time range)   hydrochlorothiazide (HYDRODIURIL) tablet 12.5 mg (has no administration in time range)   pantoprazole (PROTONIX) EC tablet 20 mg (has no administration in time range)   rOPINIRole (REQUIP) tablet 1 mg (has no administration in time range)   venlafaxine (EFFEXOR-XR) 24 hr capsule 150 mg (has no administration in time range)   influenza vaccine, high-dose (FLUZONE HIGH-DOSE) IM injection KELECHI 0.7 mL (has no administration in time range)   albuterol inhalation solution 10 mg (10 mg Nebulization Given 10/3/23 2242)     And   ipratropium (ATROVENT) 0.02 % inhalation solution 0.5 mg (0.5 mg Nebulization Given 10/3/23 2242)     And   sodium chloride 0.9 % inhalation solution 3 mL (3 mL Nebulization Given 10/3/23 2242)   methylPREDNISolone sodium succinate (Solu-MEDROL) injection 125 mg (125 mg Intravenous Given 10/3/23 2245)   magnesium sulfate 2 g/50 mL IVPB (premix) 2 g (0 g Intravenous Stopped 10/4/23 0024)   potassium chloride (K-DUR,KLOR-CON) CR tablet 40 mEq (40 mEq Oral Given 10/3/23 2240)       Diagnostic Studies  Results Reviewed     Procedure Component Value Units Date/Time    COVID/FLU/RSV [[id number]]  (Normal) Collected: 10/04/23 0100    Lab Status: Final result Specimen: Nares from Nose Updated: 10/04/23 0144     SARS-CoV-2 Negative     INFLUENZA A PCR Negative     INFLUENZA B PCR Negative     RSV PCR Negative    Narrative:      FOR PEDIATRIC PATIENTS - copy/paste COVID Guidelines URL to browser: [email or web address]    SARS-CoV-2 assay is a Nucleic Acid Amplification assay intended for the  qualitative detection of nucleic acid from SARS-CoV-2 in nasopharyngeal  swabs. Results are for the presumptive identification of SARS-CoV-2 RNA. Positive results are indicative of infection with SARS-CoV-2, the virus  causing COVID-19, but do not rule out bacterial infection or co-infection  with other viruses. Laboratories within the [facility] and its  territories are required to report all positive results to the appropriate  public health authorities. Negative results do not preclude SARS-CoV-2  infection and should not be used as the sole basis for treatment or other  patient management decisions. Negative results must be combined with  clinical observations, patient history, and epidemiological information. This test has not been FDA cleared or approved. This test has been authorized by FDA under an Emergency Use Authorization  (EUA). This test is only authorized for the duration of time the  declaration that circumstances exist justifying the authorization of the  emergency use of an in vitro diagnostic tests for detection of SARS-CoV-2  virus and/or diagnosis of COVID-19 infection under section 564(b)(1) of  the Act, 21 U. S.C. 153FTK-3(C)(4), unless the authorization is terminated  or revoked sooner. The test has been validated but independent review by FDA  and CLIA is pending. Test performed using [employer]: This RT-PCR assay targets N2,  a region unique to SARS-CoV-2. A conserved region in the E-gene was chosen  for pan-Sarbecovirus detection which includes SARS-CoV-2. According to CMS-2020-01-R, this platform meets the definition of high-throughput technology.     Magnesium [[id number]]  (Normal) Collected: 10/03/23 2212    Lab Status: Final result Specimen: Blood from Arm, Right Updated: 10/04/23 0101     Magnesium 1.9 mg/dL     HS Troponin I 2hr [[id number]]  (Normal) Collected: 10/04/23 0030    Lab Status: Final result Specimen: Blood from Line, Venous Updated: 10/04/23 0059     hs TnI 2hr 6 ng/L      Delta 2hr hsTnI -1 ng/L     HS Troponin I 4hr [[id number]]     Lab Status: No result Specimen: Blood     HS Troponin 0hr (reflex protocol) [[id number]]  (Normal) Collected: 10/03/23 2212    Lab Status: Final result Specimen: Blood from Arm, Right Updated: 10/03/23 2243     hs TnI 0hr 7 ng/L     Comprehensive metabolic panel [[id number]]  (Abnormal) Collected: 10/03/23 2212    Lab Status: Final result Specimen: Blood from Arm, Right Updated: 10/03/23 2235     Sodium 133 mmol/L      Potassium 3.1 mmol/L      Chloride 98 mmol/L      CO2 27 mmol/L      ANION GAP 8 mmol/L      BUN 13 mg/dL      Creatinine 0.81 mg/dL      Glucose 120 mg/dL      Calcium 8.6 mg/dL      Corrected Calcium 9.1 mg/dL      AST 16 U/L      ALT 16 U/L      Alkaline Phosphatase 70 U/L      Total Protein 6.6 g/dL      Albumin 3.4 g/dL      Total Bilirubin 0.60 mg/dL      eGFR 72 ml/min/1.73sq m     Narrative:      [place] guidelines for Chronic Kidney Disease (CKD):   •  Stage 1 with normal or high GFR (GFR > 90 mL/min/1.73 square meters)  •  Stage 2 Mild CKD (GFR = 60-89 mL/min/1.73 square meters)  •  Stage 3A Moderate CKD (GFR = 45-59 mL/min/1.73 square meters)  •  Stage 3B Moderate CKD (GFR = 30-44 mL/min/1.73 square meters)  •  Stage 4 Severe CKD (GFR = 15-29 mL/min/1.73 square meters)  •  Stage 5 End Stage CKD (GFR <15 mL/min/1.73 square meters)  Note: GFR calculation is accurate only with a steady state creatinine    CBC and differential [[id number]]  (Abnormal) Collected: 10/03/23 2212    Lab Status: Final result Specimen: Blood from Arm, Right Updated: 10/03/23 2223     WBC 7.91 Thousand/uL      RBC 3.65 Million/uL      Hemoglobin 10.8 g/dL      Hematocrit 33.3 %      MCV 91 fL      MCH 29.6 pg      MCHC 32.4 g/dL      RDW 13.1 %      MPV 9.6 fL      Platelets 737 Thousands/uL      nRBC 0 /100 WBCs      Neutrophils Relative 70 % Immat GRANS % 1 %      Lymphocytes Relative 16 %      Monocytes Relative 9 %      Eosinophils Relative 3 %      Basophils Relative 1 %      Neutrophils Absolute 5.60 Thousands/µL      Immature Grans Absolute 0.07 Thousand/uL      Lymphocytes Absolute 1.29 Thousands/µL      Monocytes Absolute 0.69 Thousand/µL      Eosinophils Absolute 0.22 Thousand/µL      Basophils Absolute 0.04 Thousands/µL                  XR chest 1 view portable    (Results Pending)              Procedures  ECG 12 Lead Documentation Only    Date/Time: 10/3/2023 10:15 PM    Performed by: Mic Currie PA-C  Authorized by: Mic Currie PA-C    Indications / Diagnosis:  Shortness of breath  ECG reviewed by me, the ED Provider: yes (Also reviewed by Dr. Rafael Alvarado)    Patient location:  ED  Previous ECG:     Previous ECG:  Compared to current    Comparison to cardiac monitor: No    Interpretation:     Interpretation: normal    Quality:     Tracing quality:  Limited by artifact  Rate:     ECG rate:  89    ECG rate assessment: normal    Rhythm:     Rhythm: sinus rhythm    Ectopy:     Ectopy: none    QRS:     QRS axis:  Normal    QRS intervals:  Normal  Conduction:     Conduction: normal    ST segments:     ST segments:  Normal  T waves:     T waves: normal               ED Course  ED Course as of 10/04/23 0254   Tue Oct 03, 2023   2243 hs TnI 0hr: 7             HEART Risk Score    Flowsheet Row Most Recent Value   Heart Score Risk Calculator    History 0 Filed at: 10/04/2023 0030   ECG 0 Filed at: 10/04/2023 0030   Age 2 Filed at: 10/04/2023 0030   Risk Factors 1 Filed at: 10/04/2023 0030   Troponin 0 Filed at: 10/04/2023 0030   HEART Score 3 Filed at: 10/04/2023 0030                        SBIRT 22yo+    Flowsheet Row Most Recent Value   Initial Alcohol Screen: US AUDIT-C     1. How often do you have a drink containing alcohol? 2 Filed at: 10/03/2023 5183   2. How many drinks containing alcohol do you have on a typical day you are drinking?   1 Filed at: 10/03/2023 2159   3a. Male UNDER 65: How often do you have five or more drinks on one occasion? 0 Filed at: 10/03/2023 2159   3b. FEMALE Any Age, or MALE 65+: How often do you have 4 or more drinks on one occassion? 0 Filed at: 10/03/2023 2159   Audit-C Score 3 Filed at: 10/03/2023 2159   EL: How many times in the past year have you. .. Used an illegal drug or used a prescription medication for non-medical reasons? Never Filed at: 10/03/2023 2159                    Medical Decision Making      DDX including but not limited to: pneumonia, pleural effusion, CHF, PE, PTX, ACS, MI, asthma exacerbation, COPD exacerbation, COVID 19, EVALI, anemia, metabolic abnormality, renal failure. Presenting to the ED for evaluation of 5 days of shortness of breath that worsened today. Patient using inhalers at home without improvement of symptoms. Patient also endorses cough and fatigue. She denies any fevers or chills. On exam patient has decreased air movement bilaterally with diffuse wheezing. Will treat symptomatically with a NOONAN neb, IV Solu-Medrol and IV mag. Will order CBC for evaluation of anemia infection, CMP for evaluation of LFTs, kidney function and electrolyte abnormalities. Troponin for evaluation of heart strain. EKG for evaluation of arrhythmia although low suspicion. CXR for evaluation of acute cardiopulmonary normalities. Patient feels improved after nebulizer treatments. Patient still diffusely wheezy on exam.  Patient placed on 3 L nasal cannula for hypoxia after neb treatment. Hypoxic to 88% on room air. Recommend admission for observation and continuous nebs given continued wheezing and new hypoxia requiring oxygen. At the time of admission, the patient is in no acute distress. I discussed with the patient the diagnosis, treatment plan, and plan for admission; pt was given the opportunity to ask questions and verbalized understanding.  The patient agrees with the plan of care and admission at this time. Asthma exacerbation: acute illness or injury  Amount and/or Complexity of Data Reviewed  Labs: ordered. Decision-making details documented in ED Course. Radiology: ordered. Risk  Prescription drug management. Decision regarding hospitalization. Disposition  Final diagnoses:   Asthma exacerbation     Time reflects when diagnosis was documented in both MDM as applicable and the Disposition within this note     Time User Action Codes Description Comment    10/4/2023 12:28 AM Vilma Hansontein Add [J45.901] Asthma exacerbation       ED Disposition     ED Disposition   Admit    Condition   Stable    Date/Time   Wed Oct 4, 2023 12:29 AM    Comment   Case was discussed with INGE and the patient's admission status was agreed to be Admission Status: observation status to the service of Dr. Zayda Jean.           Follow-up Information    None         Current Discharge Medication List      CONTINUE these medications which have NOT CHANGED    Details   hydrochlorothiazide (HYDRODIURIL) 12.5 mg tablet Take 12.5 mg by mouth daily      pantoprazole (PROTONIX) 20 mg tablet Take 20 mg by mouth 2 (two) times a day      rOPINIRole (REQUIP) 1 mg tablet TAKE ONE TABLET BY MOUTH TWICE A DAY AND TAKE TWO TABLETS BY MOUTH EVERY DAY AT BEDTIME      venlafaxine (EFFEXOR-XR) 150 mg 24 hr capsule Take 1 capsule by mouth daily      albuterol (2.5 mg/3 mL) 0.083 % nebulizer solution Inhale 2.5 mg every 4 (four) hours as needed      clonazePAM (KlonoPIN) 0.5 mg tablet Take 0.5 mg by mouth daily as needed      traMADol (ULTRAM) 50 mg tablet Take 50 mg by mouth 2 (two) times a day as needed             No discharge procedures on file.     PDMP Review       Value Time User    PDMP Reviewed  Yes 10/4/2023  1:54 AM Ginger Richmond PA-C          ED Provider  [facility]  10/04/23 4860

## 2023-10-04 NOTE — PLAN OF CARE
Problem: Potential for Falls  Goal: Patient will remain free of falls  Description: INTERVENTIONS:  - Educate patient/family on patient safety including physical limitations  - Instruct patient to call for assistance with activity   - Consult OT/PT to assist with strengthening/mobility   - Keep Call bell within reach  - Keep bed low and locked with side rails adjusted as appropriate  - Keep care items and personal belongings within reach  - Initiate and maintain comfort rounds  - Make Fall Risk Sign visible to staff  - Offer Toileting every 2 Hours, in advance of need  - Initiate/Maintain bed  alarm  - Apply yellow socks and bracelet for high fall risk patients  - Consider moving patient to room near nurses station  Outcome: Progressing     Problem: MOBILITY - ADULT  Goal: Maintain or return to baseline ADL function  Description: INTERVENTIONS:  -  Assess patient's ability to carry out ADLs; assess patient's baseline for ADL function and identify physical deficits which impact ability to perform ADLs (bathing, care of mouth/teeth, toileting, grooming, dressing, etc.)  - Assess/evaluate cause of self-care deficits   - Assess range of motion  - Assess patient's mobility; develop plan if impaired  - Assess patient's need for assistive devices and provide as appropriate  - Encourage maximum independence but intervene and supervise when necessary  - Involve family in performance of ADLs  - Assess for home care needs following discharge   - Consider OT consult to assist with ADL evaluation and planning for discharge  - Provide patient education as appropriate  Outcome: Progressing  Goal: Maintains/Returns to pre admission functional level  Description: INTERVENTIONS:  - Perform BMAT or MOVE assessment daily.   - Set and communicate daily mobility goal to care team and patient/family/caregiver. - Collaborate with rehabilitation services on mobility goals if consulted  - Perform Range of Motion 3 times a day.   - Reposition patient every 2 hours.   - Dangle patient 3 times a day  - Stand patient 3 times a day  - Ambulate patient 3 times a day  - Out of bed to chair 3 times a day   - Out of bed for meals 3 times a day  - Out of bed for toileting  - Record patient progress and toleration of activity level   Outcome: Progressing     Problem: DISCHARGE PLANNING  Goal: Discharge to home or other facility with appropriate resources  Description: INTERVENTIONS:  - Identify barriers to discharge w/patient and caregiver  - Arrange for needed discharge resources and transportation as appropriate  - Identify discharge learning needs (meds, wound care, etc.)  - Arrange for interpretive services to assist at discharge as needed  - Refer to Case Management Department for coordinating discharge planning if the patient needs post-hospital services based on physician/advanced practitioner order or complex needs related to functional status, cognitive ability, or social support system  Outcome: Progressing

## 2023-10-04 NOTE — DISCHARGE SUMMARY
[facility]  Discharge- Franklin Harris 1950, 68 y.o. female MRN: [id number]  Unit/Bed#: E5 -01 Encounter: [id number]  Primary Care Provider: Marck Santos MD   Date and time admitted to hospital: 10/3/2023  9:53 PM    Admitting Provider:  Juan Domingo DO  Discharge Provider:  Juan Domingo DO  Admission Date: 10/3/2023       Discharge Date: 10/04/23   LOS: 0  Primary Care Physician at Discharge: Marck Santos [phone]    DISCHARGE DIAGNOSES  * Acute pulmonary insufficiency  Assessment & Plan  History of asthma hypertension and depression who presents with worsening shortness of breath found to have tracheobronchitis/asthma exacerbation  · Improved after IV methylprednisolone and nebulizer treatments  · CXR without evidence of infiltrate  · Weaned off of any supplemental oxygen  · Discharge: Guaifenesin/codeine 120 mL, prednisone, and albuterol    Asthma  Assessment & Plan  · Asthma previously on maintenance inhalers but now only on albuterol as needed  · Due to asthma exacerbation was on IV methylprednisolone and bronchodilators  · Discharge: Patient concerns about prolonged duration of prednisone. Is agreeable to pulse prednisone 20 mg for 5 days. · Continue albuterol nebulizer as needed. HFA sent to pharmacy as well. Hypokalemia  Assessment & Plan  · Was replaced during hospitalization    Results from last 7 days   Lab Units 10/04/23  0459 10/03/23  2212   POTASSIUM mmol/L 3.2* 3.1*       Homelessness  Assessment & Plan  · Currently living in car with niece and dog  · Working on getting an apartment    RLS (restless legs syndrome)  Assessment & Plan  · Continue ropinirole    Essential hypertension  Assessment & Plan  · Stable continue HCTZ    Anxiety and depression  Assessment & Plan  · Stable continue venlafaxine    REASON FOR ADMISSION  Shortness of Breath (Pt c/o of SOB for 5 day. Pt having an increase in fatigue and a cough.  No fevers)    HOSPITAL COURSE:  Sin Cheema is a 68 y.o. female with a history of hypertension asthma depression and homelessness who presented to the hospital with worsening shortness of breath. Patient is recently homeless and lives out of car. For the last 5 days leading up to admission she has had worsening shortness of breath. She came to the emergency department where imaging did not demonstrate any infiltrates. She was admitted for asthma exacerbation. She felt much better after systemic steroids and bronchodilators. She is requesting discharge home because her niece who is living out of the car with her will need help tonight with their dog. She feels well enough for discharge and has been weaned off of any supplemental oxygen. Please see problem list listed above. CONSULTING PROVIDERS   None    PROCEDURES PERFORMED  * No surgery found *    RADIOLOGY RESULTS  XR chest 1 view portable    Result Date: 10/4/2023  Impression: No acute cardiopulmonary disease.  Workstation performed: [id number]       LABS  Results from last 7 days   Lab Units 10/04/23  0443 10/03/23  2212   WBC Thousand/uL 8.02 7.91   HEMOGLOBIN g/dL 11.1* 10.8*   HEMATOCRIT % 34.6* 33.3*   MCV fL 92 91   PLATELETS Thousands/uL 202 201     Results from last 7 days   Lab Units 10/04/23  0459 10/03/23  2212   SODIUM mmol/L 136 133*   POTASSIUM mmol/L 3.2* 3.1*   CHLORIDE mmol/L 100 98   CO2 mmol/L 25 27   BUN mg/dL 12 13   CREATININE mg/dL 0.81 0.81   CALCIUM mg/dL 8.7 8.6   ALBUMIN g/dL  --  3.4*   TOTAL BILIRUBIN mg/dL  --  0.60   ALK PHOS U/L  --  70   ALT U/L  --  16   AST U/L  --  16   EGFR ml/min/1.73sq m 72 72   GLUCOSE RANDOM mg/dL 205* 120         Results from last 7 days   Lab Units 10/04/23  0319 10/04/23  0030 10/03/23  2212   HS TNI 0HR ng/L  --   --  7   HS TNI 2HR ng/L  --  6  --    HS TNI 4HR ng/L 7  --   --               Results from last 7 days   Lab Units 10/04/23  1618 10/04/23  1110 10/04/23  0716   POC GLUCOSE mg/dl 187* 195* 174* Cultures:     Results from last 7 days   Lab Units 10/04/23  0100   INFLUENZA A PCR  Negative     Results from last 7 days   Lab Units 10/04/23  0100   SARS-COV-2  Negative   INFLUENZA A PCR  Negative   INFLUENZA B PCR  Negative   RSV PCR  Negative             DISCHARGE DAY VISIT AND PHYSICAL EXAM:  Subjective: Patient seen and examined. Feeling much better and asking about discharge home    Vitals:   Blood Pressure: 137/63 (10/04/23 1446)  Pulse: 95 (10/04/23 1446)  Temperature: 97.9 °F (36.6 °C) (10/04/23 1446)  Temp Source: Oral (10/04/23 1446)  Respirations: 17 (10/04/23 1446)  Height: 5' 2" (157.5 cm) (10/04/23 0147)  Weight - Scale: 113 kg (249 lb 1.9 oz) (10/04/23 0147)  SpO2: 94 % (10/04/23 1446)    Physical Exam  Vitals reviewed. Constitutional:       General: She is not in acute distress. Appearance: Normal appearance. She is obese. HENT:      Head: Atraumatic. Eyes:      General: No scleral icterus. Cardiovascular:      Rate and Rhythm: Regular rhythm. Heart sounds: Normal heart sounds. Pulmonary:      Breath sounds: Decreased breath sounds present. No wheezing (Faint wheezing). Abdominal:      General: Bowel sounds are normal.      Palpations: Abdomen is soft. Tenderness: There is no guarding or rebound. Musculoskeletal:         General: No swelling. Cervical back: Normal range of motion. Skin:     General: Skin is warm. Neurological:      Mental Status: She is alert. Motor: No weakness. Psychiatric:         Mood and Affect: Mood normal.       Planned Re-admission: No  Discharge Disposition: Home/Self Care (car)  Facility:     Test Results Pending at Discharge:   Incidental findings:     Medications   · Discharge Medication List: See after visit summary for reconciled discharge medications.      Diet restrictions: Regular diet  Activity restrictions: No strenuous activity  Discharge Condition: stable    Outpatient Follow-Up and Discharge Instructions  See after visit summary section titled Discharge Instructions for information provided to patient and family. Code Status: Level 1 - Full Code  Discharge Statement   This time was spent on the day of discharge. Greater than 50% of total time was spent with the patient and / or family counseling and / or coordination of care. ** Please Note: This note has been constructed using a voice recognition system.  **

## 2023-10-04 NOTE — ASSESSMENT & PLAN NOTE
· Was replaced during hospitalization    Results from last 7 days   Lab Units 10/04/23  0459 10/03/23  2212   POTASSIUM mmol/L 3.2* 3.1*

## 2023-10-04 NOTE — ASSESSMENT & PLAN NOTE
· Asthma previously on maintenance inhalers but now only on albuterol as needed  · Due to asthma exacerbation was on IV methylprednisolone and bronchodilators  · Discharge: Patient concerns about prolonged duration of prednisone. Is agreeable to pulse prednisone 20 mg for 5 days. · Continue albuterol nebulizer as needed. HFA sent to pharmacy as well.

## 2023-10-04 NOTE — PLAN OF CARE
Problem: Potential for Falls  Goal: Patient will remain free of falls  Description: INTERVENTIONS:  - Educate patient/family on patient safety including physical limitations  - Instruct patient to call for assistance with activity   - Consult OT/PT to assist with strengthening/mobility   - Keep Call bell within reach  - Keep bed low and locked with side rails adjusted as appropriate  - Keep care items and personal belongings within reach  - Initiate and maintain comfort rounds  - Make Fall Risk Sign visible to staff  - Offer Toileting every 2 Hours, in advance of need  - Initiate/Maintain bed  alarm  - Apply yellow socks and bracelet for high fall risk patients  - Consider moving patient to room near nurses station  Outcome: Progressing     Problem: MOBILITY - ADULT  Goal: Maintain or return to baseline ADL function  Description: INTERVENTIONS:  -  Assess patient's ability to carry out ADLs; assess patient's baseline for ADL function and identify physical deficits which impact ability to perform ADLs (bathing, care of mouth/teeth, toileting, grooming, dressing, etc.)  - Assess/evaluate cause of self-care deficits   - Assess range of motion  - Assess patient's mobility; develop plan if impaired  - Assess patient's need for assistive devices and provide as appropriate  - Encourage maximum independence but intervene and supervise when necessary  - Involve family in performance of ADLs  - Assess for home care needs following discharge   - Consider OT consult to assist with ADL evaluation and planning for discharge  - Provide patient education as appropriate  Outcome: Progressing  Goal: Maintains/Returns to pre admission functional level  Description: INTERVENTIONS:  - Perform BMAT or MOVE assessment daily.   - Set and communicate daily mobility goal to care team and patient/family/caregiver. - Collaborate with rehabilitation services on mobility goals if consulted  - Perform Range of Motion 3 times a day.   - Reposition patient every 2 hours.   - Dangle patient 3 times a day  - Stand patient 3 times a day  - Ambulate patient 3 times a day  - Out of bed to chair 3 times a day   - Out of bed for meals 3 times a day  - Out of bed for toileting  - Record patient progress and toleration of activity level   Outcome: Progressing     Problem: DISCHARGE PLANNING  Goal: Discharge to home or other facility with appropriate resources  Description: INTERVENTIONS:  - Identify barriers to discharge w/patient and caregiver  - Arrange for needed discharge resources and transportation as appropriate  - Identify discharge learning needs (meds, wound care, etc.)  - Arrange for interpretive services to assist at discharge as needed  - Refer to Case Management Department for coordinating discharge planning if the patient needs post-hospital services based on physician/advanced practitioner order or complex needs related to functional status, cognitive ability, or social support system  Outcome: Progressing     Problem: Potential for Falls  Goal: Patient will remain free of falls  Description: INTERVENTIONS:  - Educate patient/family on patient safety including physical limitations  - Instruct patient to call for assistance with activity   - Consult OT/PT to assist with strengthening/mobility   - Keep Call bell within reach  - Keep bed low and locked with side rails adjusted as appropriate  - Keep care items and personal belongings within reach  - Initiate and maintain comfort rounds  - Make Fall Risk Sign visible to staff  - Offer Toileting every 2 Hours, in advance of need  - Initiate/Maintain bed  alarm  - Apply yellow socks and bracelet for high fall risk patients  - Consider moving patient to room near nurses station  10/4/2023 0240 by Wily Ledbetter RN  Outcome: Progressing  10/4/2023 0239 by Wily Ledbetter RN  Outcome: Progressing     Problem: MOBILITY - ADULT  Goal: Maintain or return to baseline ADL function  Description: INTERVENTIONS:  -  Assess patient's ability to carry out ADLs; assess patient's baseline for ADL function and identify physical deficits which impact ability to perform ADLs (bathing, care of mouth/teeth, toileting, grooming, dressing, etc.)  - Assess/evaluate cause of self-care deficits   - Assess range of motion  - Assess patient's mobility; develop plan if impaired  - Assess patient's need for assistive devices and provide as appropriate  - Encourage maximum independence but intervene and supervise when necessary  - Involve family in performance of ADLs  - Assess for home care needs following discharge   - Consider OT consult to assist with ADL evaluation and planning for discharge  - Provide patient education as appropriate  10/4/2023 0240 by Donny Ma RN  Outcome: Progressing  10/4/2023 0239 by Donny Ma RN  Outcome: Progressing  Goal: Maintains/Returns to pre admission functional level  Description: INTERVENTIONS:  - Perform BMAT or MOVE assessment daily.   - Set and communicate daily mobility goal to care team and patient/family/caregiver. - Collaborate with rehabilitation services on mobility goals if consulted  - Perform Range of Motion 3 times a day. - Reposition patient every 2 hours.   - Dangle patient 3 times a day  - Stand patient 3 times a day  - Ambulate patient 3 times a day  - Out of bed to chair 3 times a day   - Out of bed for meals 3 times a day  - Out of bed for toileting  - Record patient progress and toleration of activity level   10/4/2023 0240 by Donny Ma RN  Outcome: Progressing  10/4/2023 0239 by Donny Ma RN  Outcome: Progressing     Problem: DISCHARGE PLANNING  Goal: Discharge to home or other facility with appropriate resources  Description: INTERVENTIONS:  - Identify barriers to discharge w/patient and caregiver  - Arrange for needed discharge resources and transportation as appropriate  - Identify discharge learning needs (meds, wound care, etc.)  - Arrange for interpretive services to assist at discharge as needed  - Refer to Case Management Department for coordinating discharge planning if the patient needs post-hospital services based on physician/advanced practitioner order or complex needs related to functional status, cognitive ability, or social support system  10/4/2023 0240 by Alycia Earl RN  Outcome: Progressing  10/4/2023 0239 by Alycia Earl RN  Outcome: Progressing     Problem: RESPIRATORY - ADULT  Goal: Achieves optimal ventilation and oxygenation  Description: INTERVENTIONS:  - Assess for changes in respiratory status  - Assess for changes in mentation and behavior  - Position to facilitate oxygenation and minimize respiratory effort  - Oxygen administered by appropriate delivery if ordered  - Initiate smoking cessation education as indicated  - Encourage broncho-pulmonary hygiene including cough, deep breathe, Incentive Spirometry  - Assess the need for suctioning and aspirate as needed  - Assess and instruct to report SOB or any respiratory difficulty  - Respiratory Therapy support as indicated  Outcome: Progressing     Problem: GENITOURINARY - ADULT  Goal: Maintains or returns to baseline urinary function  Description: INTERVENTIONS:  - Assess urinary function  - Encourage oral fluids to ensure adequate hydration if ordered  - Administer IV fluids as ordered to ensure adequate hydration  - Administer ordered medications as needed  - Offer frequent toileting  - Follow urinary retention protocol if ordered  Outcome: Progressing  Goal: Absence of urinary retention  Description: INTERVENTIONS:  - Assess patient’s ability to void and empty bladder  - Monitor I/O  - Bladder scan as needed  - Discuss with physician/AP medications to alleviate retention as needed  - Discuss catheterization for long term situations as appropriate  Outcome: Progressing  Goal: Urinary catheter remains patent  Description: INTERVENTIONS:  - Assess patency of urinary catheter  - If patient has a chronic mercado, consider changing catheter if non-functioning  - Follow guidelines for intermittent irrigation of non-functioning urinary catheter  Outcome: Progressing     Problem: METABOLIC, FLUID AND ELECTROLYTES - ADULT  Goal: Electrolytes maintained within normal limits  Description: INTERVENTIONS:  - Monitor labs and assess patient for signs and symptoms of electrolyte imbalances  - Administer electrolyte replacement as ordered  - Monitor response to electrolyte replacements, including repeat lab results as appropriate  - Instruct patient on fluid and nutrition as appropriate  Outcome: Progressing  Goal: Fluid balance maintained  Description: INTERVENTIONS:  - Monitor labs   - Monitor I/O and WT  - Instruct patient on fluid and nutrition as appropriate  - Assess for signs & symptoms of volume excess or deficit  Outcome: Progressing  Goal: Glucose maintained within target range  Description: INTERVENTIONS:  - Monitor Blood Glucose as ordered  - Assess for signs and symptoms of hyperglycemia and hypoglycemia  - Administer ordered medications to maintain glucose within target range  - Assess nutritional intake and initiate nutrition service referral as needed  Outcome: Progressing     Problem: SKIN/TISSUE INTEGRITY - ADULT  Goal: Skin Integrity remains intact(Skin Breakdown Prevention)  Description: Assess:  -Perform Tam assessment every   -Clean and moisturize skin every   -Inspect skin when repositioning, toileting, and assisting with ADLS  -Assess under medical devices such as  every   -Assess extremities for adequate circulation and sensation     Bed Management:  -Have minimal linens on bed & keep smooth, unwrinkled  -Change linens as needed when moist or perspiring  -Avoid sitting or lying in one position for more than  hours while in bed  -Keep HOB at egrees     Toileting:  -Offer bedside commode  -Assess for incontinence every   -Use incontinent care products after each incontinent episode such as     Activity:  -Mobilize patient  times a day  -Encourage activity and walks on unit  -Encourage or provide ROM exercises   -Turn and reposition patient every Hours  -Use appropriate equipment to lift or move patient in bed  -Instruct/ Assist with weight shifting every  when out of bed in chair  -Consider limitation of chair time  hour intervals    Skin Care:  -Avoid use of baby powder, tape, friction and shearing, hot water or constrictive clothing  -Relieve pressure over bony prominences using   -Do not massage red bony areas    Next Steps:  -Teach patient strategies to minimize risks such as    -Consider consults to  interdisciplinary teams such as Outcome: Progressing  Goal: Incision(s), wounds(s) or drain site(s) healing without S/S of infection  Description: INTERVENTIONS  - Assess and document dressing, incision, wound bed, drain sites and surrounding tissue  - Provide patient and family education  - Perform skin care/dressing changes every   Outcome: Progressing  Goal: Pressure injury heals and does not worsen  Description: Interventions:  - Implement low air loss mattress or specialty surface (Criteria met)  - Apply silicone foam dressing  - Instruct/assist with weight shifting every  minutes when in chair   - Limit chair time to  hour intervals  - Use special pressure reducing interventions such as  when in chair   - Apply fecal or urinary incontinence containment device   - Perform passive or active ROM every   - Turn and reposition patient & offload bony prominences every hours   - Utilize friction reducing device or surface for transfers   - Consider consults to  interdisciplinary teams such as   - Use incontinent care products after each incontinent episode such as Consider nutrition services referral as needed  Outcome: Progressing     Problem: MUSCULOSKELETAL - ADULT  Goal: Maintain or return mobility to safest level of function  Description: INTERVENTIONS:  - Assess patient's ability to carry out ADLs; assess patient's baseline for ADL function and identify physical deficits which impact ability to perform ADLs (bathing, care of mouth/teeth, toileting, grooming, dressing, etc.)  - Assess/evaluate cause of self-care deficits   - Assess range of motion  - Assess patient's mobility  - Assess patient's need for assistive devices and provide as appropriate  - Encourage maximum independence but intervene and supervise when necessary  - Involve family in performance of ADLs  - Assess for home care needs following discharge   - Consider OT consult to assist with ADL evaluation and planning for discharge  - Provide patient education as appropriate  Outcome: Progressing  Goal: Maintain proper alignment of affected body part  Description: INTERVENTIONS:  - Support, maintain and protect limb and body alignment  - Provide patient/ family with appropriate education  Outcome: Progressing

## 2023-10-04 NOTE — ASSESSMENT & PLAN NOTE
In setting of asthma/copd exacerbation     Plan:  • Respiratory hygiene, continuous pulse ox   • Wean oxygen as able

## 2023-10-04 NOTE — RESTORATIVE TECHNICIAN NOTE
Restorative Technician Note      Patient Name: Andrzej Ko     Restorative Tech Visit Date: 10/04/23  Note Type: Mobility  Patient Position Upon Consult: Supine  Mobility / Activity Provided: assisted pt in getting out of bed for ambulation around hallway, pt o2 dropped towars end of 150 ft walk, assisted pt in getting into [facility] for Lunch  Activity Performed: Ambulated  Assistive Device: Roller walker  Patient Position at End of Consult: Bedside chair;  All needs within reach; Bed/Chair alarm activated

## 2023-11-30 ENCOUNTER — APPOINTMENT (EMERGENCY)
Dept: CT IMAGING | Facility: HOSPITAL | Age: 73
End: 2023-11-30
Payer: COMMERCIAL

## 2023-11-30 ENCOUNTER — HOSPITAL ENCOUNTER (EMERGENCY)
Facility: HOSPITAL | Age: 73
Discharge: HOME | End: 2023-11-30
Attending: EMERGENCY MEDICINE
Payer: COMMERCIAL

## 2023-11-30 ENCOUNTER — APPOINTMENT (EMERGENCY)
Dept: RADIOLOGY | Facility: HOSPITAL | Age: 73
End: 2023-11-30
Payer: COMMERCIAL

## 2023-11-30 VITALS
TEMPERATURE: 97.8 F | SYSTOLIC BLOOD PRESSURE: 132 MMHG | BODY MASS INDEX: 44.02 KG/M2 | RESPIRATION RATE: 16 BRPM | DIASTOLIC BLOOD PRESSURE: 58 MMHG | HEART RATE: 80 BPM | WEIGHT: 239.2 LBS | HEIGHT: 62 IN | OXYGEN SATURATION: 98 %

## 2023-11-30 DIAGNOSIS — S70.212A ABRASION, LEFT HIP, INITIAL ENCOUNTER: ICD-10-CM
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
## 2023-11-30 DIAGNOSIS — S40.022A SUPERFICIAL BRUISING OF ARM, LEFT, INITIAL ENCOUNTER: ICD-10-CM

## 2023-11-30 DIAGNOSIS — S09.90XA INJURY OF HEAD, INITIAL ENCOUNTER: ICD-10-CM

## 2023-11-30 PROCEDURE — 99284 EMERGENCY DEPT VISIT MOD MDM: CPT

## 2023-11-30 PROCEDURE — 73502 X-RAY EXAM HIP UNI 2-3 VIEWS: CPT

## 2023-11-30 PROCEDURE — 99284 EMERGENCY DEPT VISIT MOD MDM: CPT | Performed by: EMERGENCY MEDICINE

## 2023-11-30 PROCEDURE — 70450 CT HEAD/BRAIN W/O DYE: CPT

## 2023-11-30 PROCEDURE — 73060 X-RAY EXAM OF HUMERUS: CPT

## 2023-11-30 RX ORDER — ACETAMINOPHEN 325 MG/1
650 TABLET ORAL ONCE
Status: COMPLETED | OUTPATIENT
Start: 2023-11-30 | End: 2023-11-30

## 2023-11-30 RX ADMIN — ACETAMINOPHEN 325MG 650 MG: 325 TABLET ORAL at 13:42
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
## 2023-12-02 ENCOUNTER — APPOINTMENT (EMERGENCY)
Dept: RADIOLOGY | Facility: HOSPITAL | Age: 73
End: 2023-12-02
Payer: MEDICARE

## 2023-12-02 ENCOUNTER — HOSPITAL ENCOUNTER (EMERGENCY)
Facility: HOSPITAL | Age: 73
Discharge: HOME | End: 2023-12-03
Attending: EMERGENCY MEDICINE
Payer: MEDICARE

## 2023-12-02 DIAGNOSIS — M79.605 LEFT LEG PAIN: Primary | ICD-10-CM

## 2023-12-02 PROCEDURE — 93971 EXTREMITY STUDY: CPT | Performed by: EMERGENCY MEDICINE

## 2023-12-02 PROCEDURE — 99284 EMERGENCY DEPT VISIT MOD MDM: CPT

## 2023-12-02 PROCEDURE — 73564 X-RAY EXAM KNEE 4 OR MORE: CPT

## 2023-12-02 PROCEDURE — 99284 EMERGENCY DEPT VISIT MOD MDM: CPT | Performed by: EMERGENCY MEDICINE

## 2023-12-03 ENCOUNTER — HOSPITAL ENCOUNTER (OUTPATIENT)
Dept: NON INVASIVE DIAGNOSTICS | Facility: HOSPITAL | Age: 73
Discharge: HOME | End: 2023-12-03
Payer: MEDICARE

## 2023-12-03 VITALS
OXYGEN SATURATION: 94 % | BODY MASS INDEX: 43.63 KG/M2 | RESPIRATION RATE: 16 BRPM | DIASTOLIC BLOOD PRESSURE: 56 MMHG | TEMPERATURE: 98.5 F | HEART RATE: 71 BPM | WEIGHT: 238.54 LBS | SYSTOLIC BLOOD PRESSURE: 111 MMHG

## 2023-12-03 DIAGNOSIS — M79.605 LEFT LEG PAIN: ICD-10-CM

## 2023-12-03 PROCEDURE — 93971 EXTREMITY STUDY: CPT

## 2023-12-03 RX ORDER — CYCLOBENZAPRINE HCL 10 MG
5 TABLET ORAL ONCE
Status: COMPLETED | OUTPATIENT
Start: 2023-12-03 | End: 2023-12-03

## 2023-12-03 RX ADMIN — CYCLOBENZAPRINE HYDROCHLORIDE 5 MG: 10 TABLET, FILM COATED ORAL at 00:50
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
## 2023-12-04 PROCEDURE — 93971 EXTREMITY STUDY: CPT | Performed by: SURGERY

## 2023-12-16 ENCOUNTER — HOSPITAL ENCOUNTER (EMERGENCY)
Facility: HOSPITAL | Age: 73
Discharge: HOME | End: 2023-12-16
Attending: EMERGENCY MEDICINE | Admitting: EMERGENCY MEDICINE
Payer: MEDICARE

## 2023-12-16 VITALS
SYSTOLIC BLOOD PRESSURE: 136 MMHG | WEIGHT: 238.98 LBS | DIASTOLIC BLOOD PRESSURE: 84 MMHG | BODY MASS INDEX: 43.71 KG/M2 | RESPIRATION RATE: 20 BRPM | HEART RATE: 98 BPM | TEMPERATURE: 98.6 F | OXYGEN SATURATION: 97 %

## 2023-12-16 DIAGNOSIS — Z59.00 HOMELESSNESS: ICD-10-CM

## 2023-12-16 DIAGNOSIS — U07.1 COVID: Primary | ICD-10-CM

## 2023-12-16 PROCEDURE — 99283 EMERGENCY DEPT VISIT LOW MDM: CPT

## 2023-12-16 PROCEDURE — 99284 EMERGENCY DEPT VISIT MOD MDM: CPT | Performed by: PHYSICIAN ASSISTANT

## 2023-12-16 SDOH — ECONOMIC STABILITY - HOUSING INSECURITY: HOMELESSNESS UNSPECIFIED: Z59.00

## 2023-12-17 NOTE — ED PROVIDER NOTES
History  Chief Complaint   Patient presents with    COVID-19 Swab Only     Reports needs negative covid result to get into shelter. Sathya any symptoms at this time.      This is a 73-year-old female presenting to ED for COVID swab.  Patient is homeless and typically sleeps at a shelter.  Patient states she needs a negative COVID swab to return to the shelter.  Patient tested positive for COVID on 12/12/2023.  Patient is on Paxlovid for COVID.  Patient is on prednisone and inhalers for COPD.  Patient states that she continues to have cough but believes that is more her COPD than COVID.  Patient states she has not had any fevers recently.      History provided by:  Patient   used: No        Prior to Admission Medications   Prescriptions Last Dose Informant Patient Reported? Taking?   albuterol (2.5 mg/3 mL) 0.083 % nebulizer solution   Yes No   Sig: Inhale 2.5 mg every 4 (four) hours as needed   albuterol (Ventolin HFA) 90 mcg/act inhaler   No No   Sig: Inhale 2 puffs every 6 (six) hours as needed for wheezing   clonazePAM (KlonoPIN) 0.5 mg tablet   Yes No   Sig: Take 0.5 mg by mouth daily as needed   guaifenesin-codeine (GUAIFENESIN AC) 100-10 MG/5ML liquid   No No   Sig: Take 5 mL by mouth 3 (three) times a day as needed for cough   hydrochlorothiazide (HYDRODIURIL) 12.5 mg tablet   Yes No   Sig: Take 12.5 mg by mouth daily   pantoprazole (PROTONIX) 20 mg tablet   Yes No   Sig: Take 20 mg by mouth 2 (two) times a day   rOPINIRole (REQUIP) 1 mg tablet   Yes No   Sig: TAKE ONE TABLET BY MOUTH TWICE A DAY AND TAKE TWO TABLETS BY MOUTH EVERY DAY AT BEDTIME   traMADol (ULTRAM) 50 mg tablet   Yes No   Sig: Take 50 mg by mouth 2 (two) times a day as needed   venlafaxine (EFFEXOR-XR) 150 mg 24 hr capsule   Yes No   Sig: Take 1 capsule by mouth daily      Facility-Administered Medications: None       Past Medical History:   Diagnosis Date    Asthma     COPD (chronic obstructive pulmonary disease) (HCC)      Hypertension     Palpitations        Past Surgical History:   Procedure Laterality Date    BACK SURGERY      CHOLECYSTECTOMY      HYSTERECTOMY         History reviewed. No pertinent family history.  I have reviewed and agree with the history as documented.    E-Cigarette/Vaping    E-Cigarette Use Never User      E-Cigarette/Vaping Substances     Social History     Tobacco Use    Smoking status: Every Day     Current packs/day: 0.75     Types: Cigarettes    Smokeless tobacco: Never   Vaping Use    Vaping status: Never Used   Substance Use Topics    Alcohol use: Yes     Comment: 1 a month    Drug use: Never       Review of Systems   Constitutional:  Negative for chills and fever.   HENT:  Negative for congestion.    Respiratory:  Positive for cough. Negative for shortness of breath.    Cardiovascular:  Negative for chest pain and palpitations.   Gastrointestinal:  Negative for diarrhea and vomiting.   All other systems reviewed and are negative.      Physical Exam  Physical Exam  Vitals reviewed.   Constitutional:       General: She is not in acute distress.     Appearance: Normal appearance. She is well-developed and well-groomed. She is not ill-appearing.   HENT:      Head: Normocephalic and atraumatic.      Right Ear: External ear normal.      Left Ear: External ear normal.      Nose: Nose normal.   Eyes:      General: No scleral icterus.     Conjunctiva/sclera: Conjunctivae normal.   Cardiovascular:      Rate and Rhythm: Normal rate and regular rhythm.   Pulmonary:      Effort: Pulmonary effort is normal.      Breath sounds: No stridor.   Abdominal:      General: There is no distension.   Musculoskeletal:         General: No deformity. Normal range of motion.      Cervical back: Normal range of motion.   Skin:     Coloration: Skin is not jaundiced or pale.      Findings: No lesion or rash.   Neurological:      Mental Status: She is alert and oriented to person, place, and time.   Psychiatric:         Mood and  Affect: Mood normal.         Behavior: Behavior normal. Behavior is cooperative.         Vital Signs  ED Triage Vitals [12/16/23 2022]   Temperature Pulse Respirations Blood Pressure SpO2   98.6 °F (37 °C) 98 20 136/84 97 %      Temp Source Heart Rate Source Patient Position - Orthostatic VS BP Location FiO2 (%)   Oral Monitor Sitting Right arm --      Pain Score       No Pain           Vitals:    12/16/23 2022   BP: 136/84   Pulse: 98   Patient Position - Orthostatic VS: Sitting         Visual Acuity      ED Medications  Medications - No data to display    Diagnostic Studies  Results Reviewed       None                   No orders to display              Procedures  Procedures         ED Course                               SBIRT 22yo+      Flowsheet Row Most Recent Value   Initial Alcohol Screen: US AUDIT-C     1. How often do you have a drink containing alcohol? 1 Filed at: 12/16/2023 2211   2. How many drinks containing alcohol do you have on a typical day you are drinking?  1 Filed at: 12/16/2023 2211   3b. FEMALE Any Age, or MALE 65+: How often do you have 4 or more drinks on one occassion? 0 Filed at: 12/16/2023 2211   Audit-C Score 2 Filed at: 12/16/2023 2211   EL: How many times in the past year have you...    Used an illegal drug or used a prescription medication for non-medical reasons? Never Filed at: 12/16/2023 2211                      Medical Decision Making      Patient presenting to the ED for COVID swab.  Patient is homeless and needs a negative swab to return to the [facility] shelter.  Informed patient that due to recent COVID-positive, unlikely that test will be negative at this time.  Unsure when test will return as negative.  Patient is currently without symptoms of COVID aside from cough however patient attributes this to her COPD.  Will allow patient to shower in the emergency department and discharge.    Prior to discharge, discharge instructions were discussed with patient at bedside.  Patient was provided both verbal and written instructions. Patient is understanding of the discharge instructions and is agreeable to plan of care. Return precautions were discussed with patient bedside, patient verbalized understanding of signs and symptoms that would necessitate return to the ED. All questions were answered. Patient was comfortable with the plan of care and discharged to home.     Dispo: discharge home with follow up to PCP. Patient stable, in no acute distress and non-toxic at discharge.    Problems Addressed:  COVID: acute illness or injury  Homelessness: chronic illness or injury             Disposition  Final diagnoses:   COVID   Homelessness     Time reflects when diagnosis was documented in both MDM as applicable and the Disposition within this note       Time User Action Codes Description Comment    12/16/2023 10:05 PM Betsy Salcedo [U07.1] COVID     12/16/2023 10:06 PM Betsy Salcedo [Z59.00] Homelessness           ED Disposition       ED Disposition   Discharge    Condition   Stable    Date/Time   Sat Dec 16, 2023 2124    Comment   Jeni Gutierrez discharge to home/self care.                   Follow-up Information       Follow up With Specialties Details Why Contact Info    Maryan Yo MD  Schedule an appointment as soon as possible for a visit  As needed [address]  [address]  [place] [address]  [phone]              Current Discharge Medication List        CONTINUE these medications which have NOT CHANGED    Details   albuterol (2.5 mg/3 mL) 0.083 % nebulizer solution Inhale 2.5 mg every 4 (four) hours as needed      albuterol (Ventolin HFA) 90 mcg/act inhaler Inhale 2 puffs every 6 (six) hours as needed for wheezing  Qty: 18 g, Refills: 0    Comments: Substitution to a formulary equivalent within the same pharmaceutical class is authorized.  Associated Diagnoses: Asthma exacerbation      clonazePAM (KlonoPIN) 0.5 mg tablet Take 0.5 mg by mouth daily as  needed      guaifenesin-codeine (GUAIFENESIN AC) 100-10 MG/5ML liquid Take 5 mL by mouth 3 (three) times a day as needed for cough  Qty: 120 mL, Refills: 0    Associated Diagnoses: Asthma exacerbation      hydrochlorothiazide (HYDRODIURIL) 12.5 mg tablet Take 12.5 mg by mouth daily      pantoprazole (PROTONIX) 20 mg tablet Take 20 mg by mouth 2 (two) times a day      rOPINIRole (REQUIP) 1 mg tablet TAKE ONE TABLET BY MOUTH TWICE A DAY AND TAKE TWO TABLETS BY MOUTH EVERY DAY AT BEDTIME      traMADol (ULTRAM) 50 mg tablet Take 50 mg by mouth 2 (two) times a day as needed      venlafaxine (EFFEXOR-XR) 150 mg 24 hr capsule Take 1 capsule by mouth daily             No discharge procedures on file.    PDMP Review         Value Time User    PDMP Reviewed  Yes 10/4/2023  1:54 AM Jer Bravo PA-C            ED Provider  Electronically Signed by Betsy Salcedo PA-C  12/16/23 5599

## 2023-12-19 ENCOUNTER — HOSPITAL ENCOUNTER (EMERGENCY)
Facility: HOSPITAL | Age: 73
Discharge: HOME | End: 2023-12-19
Attending: EMERGENCY MEDICINE
Payer: MEDICARE

## 2023-12-19 VITALS
DIASTOLIC BLOOD PRESSURE: 71 MMHG | OXYGEN SATURATION: 97 % | BODY MASS INDEX: 44.19 KG/M2 | WEIGHT: 241.62 LBS | SYSTOLIC BLOOD PRESSURE: 156 MMHG | RESPIRATION RATE: 18 BRPM | HEART RATE: 84 BPM | TEMPERATURE: 98 F

## 2023-12-19 DIAGNOSIS — R05.9 COUGH: ICD-10-CM

## 2023-12-19 DIAGNOSIS — U07.1 COVID: Primary | ICD-10-CM

## 2023-12-19 LAB
FLUAV RNA RESP QL NAA+PROBE: NEGATIVE
FLUBV RNA RESP QL NAA+PROBE: NEGATIVE
RSV RNA RESP QL NAA+PROBE: NEGATIVE
SARS-COV-2 RNA RESP QL NAA+PROBE: POSITIVE

## 2023-12-19 PROCEDURE — 99284 EMERGENCY DEPT VISIT MOD MDM: CPT

## 2023-12-19 PROCEDURE — 94640 AIRWAY INHALATION TREATMENT: CPT

## 2023-12-19 PROCEDURE — 0241U HB NFCT DS VIR RESP RNA 4 TRGT: CPT

## 2023-12-19 RX ORDER — IPRATROPIUM BROMIDE AND ALBUTEROL SULFATE 2.5; .5 MG/3ML; MG/3ML
3 SOLUTION RESPIRATORY (INHALATION) ONCE
Status: COMPLETED | OUTPATIENT
Start: 2023-12-19 | End: 2023-12-19

## 2023-12-19 RX ADMIN — IPRATROPIUM BROMIDE AND ALBUTEROL SULFATE 3 ML: 2.5; .5 SOLUTION RESPIRATORY (INHALATION) at 20:29

## 2023-12-20 NOTE — ED PROVIDER NOTES
History  Chief Complaint   Patient presents with    COVID-19 Swab Only     Pt reports would like a covid test so [facility].      73-year-old female with history of asthma, COPD, hypertension presents to ED for COVID testing.  Patient was recently diagnosed with COVID last week.  Patient struggles with consistent housing.  She is currently living in her car.  [facility] to avoid the cold in her car.  Due to her positive COVID status she is unable to go back to the [facility].  She is here to obtain a COVID swab in order to regain entry to [facility].  Patient still having symptoms of COVID.  She endorses cough.  Overall she feels better than when she initially developed symptoms.  Patient denies fever, chills, nausea, vomiting, shortness of breath, chest pain, abdominal pain, pain with urination, increased urinary frequency.         Prior to Admission Medications   Prescriptions Last Dose Informant Patient Reported? Taking?   albuterol (2.5 mg/3 mL) 0.083 % nebulizer solution   Yes No   Sig: Inhale 2.5 mg every 4 (four) hours as needed   albuterol (Ventolin HFA) 90 mcg/act inhaler   No No   Sig: Inhale 2 puffs every 6 (six) hours as needed for wheezing   clonazePAM (KlonoPIN) 0.5 mg tablet   Yes No   Sig: Take 0.5 mg by mouth daily as needed   guaifenesin-codeine (GUAIFENESIN AC) 100-10 MG/5ML liquid   No No   Sig: Take 5 mL by mouth 3 (three) times a day as needed for cough   hydrochlorothiazide (HYDRODIURIL) 12.5 mg tablet   Yes No   Sig: Take 12.5 mg by mouth daily   pantoprazole (PROTONIX) 20 mg tablet   Yes No   Sig: Take 20 mg by mouth 2 (two) times a day   rOPINIRole (REQUIP) 1 mg tablet   Yes No   Sig: TAKE ONE TABLET BY MOUTH TWICE A DAY AND TAKE TWO TABLETS BY MOUTH EVERY DAY AT BEDTIME   traMADol (ULTRAM) 50 mg tablet   Yes No   Sig: Take 50 mg by mouth 2 (two) times a day as needed   venlafaxine (EFFEXOR-XR) 150 mg 24 hr capsule    Yes No   Sig: Take 1 capsule by mouth daily      Facility-Administered Medications: None       Past Medical History:   Diagnosis Date    Asthma     COPD (chronic obstructive pulmonary disease) (HCC)     Hypertension     Palpitations        Past Surgical History:   Procedure Laterality Date    BACK SURGERY      CHOLECYSTECTOMY      HYSTERECTOMY         History reviewed. No pertinent family history.  I have reviewed and agree with the history as documented.    E-Cigarette/Vaping    E-Cigarette Use Never User      E-Cigarette/Vaping Substances     Social History     Tobacco Use    Smoking status: Every Day     Current packs/day: 0.75     Types: Cigarettes    Smokeless tobacco: Never   Vaping Use    Vaping status: Never Used   Substance Use Topics    Alcohol use: Yes     Comment: 1 a month    Drug use: Never       Review of Systems   Constitutional:  Negative for chills, diaphoresis, fatigue and fever.   HENT:  Negative for ear pain and sore throat.    Eyes:  Negative for pain and visual disturbance.   Respiratory:  Positive for cough. Negative for shortness of breath, wheezing and stridor.    Cardiovascular:  Negative for chest pain and palpitations.   Gastrointestinal:  Negative for abdominal pain and vomiting.   Genitourinary:  Negative for dysuria and hematuria.   Musculoskeletal:  Negative for arthralgias and back pain.   Skin:  Negative for color change and rash.   Neurological:  Negative for seizures and syncope.   All other systems reviewed and are negative.      Physical Exam  Physical Exam  Vitals and nursing note reviewed.   Constitutional:       General: She is not in acute distress.     Appearance: Normal appearance. She is well-developed. She is not ill-appearing, toxic-appearing or diaphoretic.   HENT:      Head: Normocephalic and atraumatic.      Nose: Congestion present.      Mouth/Throat:      Pharynx: No oropharyngeal exudate or posterior oropharyngeal erythema.   Eyes:      General: No scleral  icterus.        Right eye: No discharge.         Left eye: No discharge.      Extraocular Movements: Extraocular movements intact.      Conjunctiva/sclera: Conjunctivae normal.      Pupils: Pupils are equal, round, and reactive to light.   Cardiovascular:      Rate and Rhythm: Normal rate and regular rhythm.      Pulses: Normal pulses.      Heart sounds: Normal heart sounds. No murmur heard.     No friction rub. No gallop.   Pulmonary:      Effort: Pulmonary effort is normal. No respiratory distress.      Breath sounds: No stridor. Wheezing present. No rhonchi or rales.   Abdominal:      Palpations: Abdomen is soft.      Tenderness: There is no abdominal tenderness.   Musculoskeletal:      Cervical back: Neck supple.      Right lower leg: Edema present.      Left lower leg: Edema present.   Skin:     General: Skin is warm and dry.      Capillary Refill: Capillary refill takes less than 2 seconds.   Neurological:      Mental Status: She is alert.   Psychiatric:         Mood and Affect: Mood normal.         Vital Signs  ED Triage Vitals [12/19/23 1925]   Temperature Pulse Respirations Blood Pressure SpO2   98 °F (36.7 °C) 84 18 156/71 97 %      Temp Source Heart Rate Source Patient Position - Orthostatic VS BP Location FiO2 (%)   Oral Monitor Sitting Right arm --      Pain Score       No Pain           Vitals:    12/19/23 1925   BP: 156/71   Pulse: 84   Patient Position - Orthostatic VS: Sitting         Visual Acuity      ED Medications  Medications   ipratropium-albuterol (DUO-NEB) 0.5-2.5 mg/3 mL inhalation solution 3 mL (3 mL Nebulization Given 12/19/23 2029)       Diagnostic Studies  Results Reviewed       Procedure Component Value Units Date/Time    FLU/RSV/COVID - if FLU/RSV clinically relevant [[id number]]  (Abnormal) Collected: 12/19/23 2026    Lab Status: Final result Specimen: Nares from Nose Updated: 12/19/23 2107     SARS-CoV-2 Positive     INFLUENZA A PCR Negative     INFLUENZA B PCR Negative     RSV PCR  Negative    Narrative:      FOR PEDIATRIC PATIENTS - copy/paste COVID Guidelines URL to browser: [email or web address]    SARS-CoV-2 assay is a Nucleic Acid Amplification assay intended for the  qualitative detection of nucleic acid from SARS-CoV-2 in nasopharyngeal  swabs. Results are for the presumptive identification of SARS-CoV-2 RNA.    Positive results are indicative of infection with SARS-CoV-2, the virus  causing COVID-19, but do not rule out bacterial infection or co-infection  with other viruses. Laboratories within the [place] and its  territories are required to report all positive results to the appropriate  public health authorities. Negative results do not preclude SARS-CoV-2  infection and should not be used as the sole basis for treatment or other  patient management decisions. Negative results must be combined with  clinical observations, patient history, and epidemiological information.  This test has not been FDA cleared or approved.    This test has been authorized by FDA under an Emergency Use Authorization  (EUA). This test is only authorized for the duration of time the  declaration that circumstances exist justifying the authorization of the  emergency use of an in vitro diagnostic tests for detection of SARS-CoV-2  virus and/or diagnosis of COVID-19 infection under section 564(b)(1) of  the Act, 21 U.S.C. 360bbb-3(b)(1), unless the authorization is terminated  or revoked sooner. The test has been validated but independent review by FDA  and CLIA is pending.    Test performed using [employer] Cloud[employer]: This RT-PCR assay targets N2,  a region unique to SARS-CoV-2. A conserved region in the E-gene was chosen  for pan-Sarbecovirus detection which includes SARS-CoV-2.    According to CMS-2020-01-R, this platform meets the definition of high-throughput technology.                   No orders to display              Procedures  Procedures          ED Course                                             Medical Decision Making  73-year-old female presents ED for COVID testing.  She unfortunately is experiencing homelessness.  She sleeps in her car.  Due to the cold weather [facility] to [facility] to [facility] the [facility].  Due to her recent COVID infection, she is not allowed to return to [facility] until she has a negative COVID swab.  She initially tested positive for COVID on December 12, 2023.  Her symptoms have improved since then.  Still has a cough.  Overall feels better.  Patient adamant that she just wants a COVID swab today.  On physical examination I did notice that her legs did appear to have some edema.  Patient states that the edema is due to being in her car.  Normally when she is able to lay down for the night to the swelling does not occur.  There is some wheezing in her lung field.  Normal heart sounds.  She is normotensive, afebrile, nontachycardic.  Not visibly in respiratory distress.  Nontoxic-appearing.  Noncyanotic.  Advised patient that her COVID swab will likely remain positive given that it is so close in proximity to recent COVID infection.  Patient agreement with patient.  Will perform COVID swab today though it would likely be positive again.  Will also provide her with DuoNeb treatment given her history of asthma, COPD and hearing wheezing on lung auscultation today.     COVID swab returned positive as expected.  Patient does still have symptoms of COVID at this time.  Whether it is due to asthma exacerbation is uncertain.  She has albuterol inhaler with her.  Does have a nebulizer however does not have a place to plug the nebulizer in.  Patient feels slightly better after DuoNeb treatment.  I spent time calling various local resources to see if there is options for people experiencing homelessness with a positive COVID test.  Unfortunately was unable to establish communication with local resources.  Had  conversation about staying in ED lobby overnight with the patient.  She is agreeable to this.  Advised patient to call [facility] in the morning and to discuss how long they would like her to wait following positive COVID test before she can return to the [facility].  Patient agreeable to staying in the ED lobby overnight to avoid going into freezing temperatures.  Patient discharged.    Prior to discharge, discharge instructions were discussed with patient at bedside. Patient was provided both verbal and written instructions. Patient is understanding of the discharge instructions and is agreeable to plan of care. Return precautions were discussed with patient bedside, patient verbalized understanding of signs and symptoms that would necessitate return to the ED. All questions were answered. Patient was comfortable with the plan of care and discharged to home.     Risk  Prescription drug management.             Disposition  Final diagnoses:   COVID   Cough     Time reflects when diagnosis was documented in both MDM as applicable and the Disposition within this note       Time User Action Codes Description Comment    12/19/2023 10:38 PM Darci Watson Add [U07.1] COVID     12/19/2023 10:38 PM Darci Watson Add [R05.9] Cough           ED Disposition       ED Disposition   Discharge    Condition   Stable    Date/Time   Tue Dec 19, 2023 10:38 PM    Comment   Jeni Gutierrez discharge to home/self care.                   Follow-up Information       Follow up With Specialties Details Why Contact Info    Maryan Yo MD    [address].  [address]  [place] [address] [phone]              Discharge Medication List as of 12/19/2023 10:41 PM        CONTINUE these medications which have NOT CHANGED    Details   albuterol (2.5 mg/3 mL) 0.083 % nebulizer solution Inhale 2.5 mg every 4 (four) hours as needed, Starting Fri 2/3/2023, Until Sat 2/3/2024 at 2359, Historical Med      albuterol (Ventolin HFA) 90  mcg/act inhaler Inhale 2 puffs every 6 (six) hours as needed for wheezing, Starting Wed 10/4/2023, Normal      clonazePAM (KlonoPIN) 0.5 mg tablet Take 0.5 mg by mouth daily as needed, Starting Fri 8/18/2023, Historical Med      guaifenesin-codeine (GUAIFENESIN AC) 100-10 MG/5ML liquid Take 5 mL by mouth 3 (three) times a day as needed for cough, Starting Wed 10/4/2023, Normal      hydrochlorothiazide (HYDRODIURIL) 12.5 mg tablet Take 12.5 mg by mouth daily, Starting Fri 7/7/2023, Historical Med      pantoprazole (PROTONIX) 20 mg tablet Take 20 mg by mouth 2 (two) times a day, Starting Wed 4/26/2023, Until Thu 4/25/2024, Historical Med      rOPINIRole (REQUIP) 1 mg tablet TAKE ONE TABLET BY MOUTH TWICE A DAY AND TAKE TWO TABLETS BY MOUTH EVERY DAY AT BEDTIME, Historical Med      traMADol (ULTRAM) 50 mg tablet Take 50 mg by mouth 2 (two) times a day as needed, Starting Fri 8/18/2023, Historical Med      venlafaxine (EFFEXOR-XR) 150 mg 24 hr capsule Take 1 capsule by mouth daily, Starting Thu 6/15/2023, Historical Med             No discharge procedures on file.    PDMP Review         Value Time User    PDMP Reviewed  Yes 10/4/2023  1:54 AM Jer Bravo PA-C            ED Provider  Electronically Signed by             Darci Watson PA-C  12/21/23 5184

## 2024-02-10 ENCOUNTER — HOSPITAL ENCOUNTER (EMERGENCY)
Facility: HOSPITAL | Age: 74
Discharge: HOME | End: 2024-02-11
Attending: EMERGENCY MEDICINE
Payer: MEDICARE

## 2024-02-10 ENCOUNTER — APPOINTMENT (EMERGENCY)
Dept: RADIOLOGY | Facility: HOSPITAL | Age: 74
End: 2024-02-10
Payer: MEDICARE

## 2024-02-10 DIAGNOSIS — N39.0 UTI (URINARY TRACT INFECTION): Primary | ICD-10-CM

## 2024-02-10 DIAGNOSIS — R60.0 BILATERAL LOWER EXTREMITY EDEMA: ICD-10-CM

## 2024-02-10 LAB
2HR DELTA HS TROPONIN: -1 NG/L
ALBUMIN SERPL BCP-MCNC: 3.6 G/DL (ref 3.5–5)
ALP SERPL-CCNC: 70 U/L (ref 34–104)
ALT SERPL W P-5'-P-CCNC: 8 U/L (ref 7–52)
ANION GAP SERPL CALCULATED.3IONS-SCNC: 8 MMOL/L
AST SERPL W P-5'-P-CCNC: 14 U/L (ref 13–39)
BACTERIA UR QL AUTO: ABNORMAL /HPF
BASOPHILS # BLD AUTO: 0.03 THOUSANDS/ÂΜL (ref 0–0.1)
BASOPHILS NFR BLD AUTO: 1 % (ref 0–1)
BILIRUB DIRECT SERPL-MCNC: 0.11 MG/DL (ref 0–0.2)
BILIRUB SERPL-MCNC: 0.49 MG/DL (ref 0.2–1)
BILIRUB UR QL STRIP: NEGATIVE
BNP SERPL-MCNC: 23 PG/ML (ref 0–100)
BUN SERPL-MCNC: 8 MG/DL (ref 5–25)
CALCIUM SERPL-MCNC: 9 MG/DL (ref 8.4–10.2)
CARDIAC TROPONIN I PNL SERPL HS: 7 NG/L
CARDIAC TROPONIN I PNL SERPL HS: 8 NG/L
CHLORIDE SERPL-SCNC: 96 MMOL/L (ref 96–108)
CLARITY UR: CLEAR
CO2 SERPL-SCNC: 31 MMOL/L (ref 21–32)
COLOR UR: YELLOW
CREAT SERPL-MCNC: 0.96 MG/DL (ref 0.6–1.3)
EOSINOPHIL # BLD AUTO: 0.11 THOUSAND/ÂΜL (ref 0–0.61)
EOSINOPHIL NFR BLD AUTO: 2 % (ref 0–6)
ERYTHROCYTE [DISTWIDTH] IN BLOOD BY AUTOMATED COUNT: 13.9 % (ref 11.6–15.1)
FLUAV RNA RESP QL NAA+PROBE: NEGATIVE
FLUBV RNA RESP QL NAA+PROBE: NEGATIVE
GFR SERPL CREATININE-BSD FRML MDRD: 58 ML/MIN/1.73SQ M
GLUCOSE SERPL-MCNC: 108 MG/DL (ref 65–140)
GLUCOSE UR STRIP-MCNC: NEGATIVE MG/DL
HCT VFR BLD AUTO: 29.4 % (ref 34.8–46.1)
HGB BLD-MCNC: 9.9 G/DL (ref 11.5–15.4)
HGB UR QL STRIP.AUTO: ABNORMAL
IMM GRANULOCYTES # BLD AUTO: 0.05 THOUSAND/UL (ref 0–0.2)
IMM GRANULOCYTES NFR BLD AUTO: 1 % (ref 0–2)
KETONES UR STRIP-MCNC: NEGATIVE MG/DL
LEUKOCYTE ESTERASE UR QL STRIP: ABNORMAL
LYMPHOCYTES # BLD AUTO: 1.34 THOUSANDS/ÂΜL (ref 0.6–4.47)
LYMPHOCYTES NFR BLD AUTO: 27 % (ref 14–44)
MAGNESIUM SERPL-MCNC: 1.9 MG/DL (ref 1.9–2.7)
MCH RBC QN AUTO: 29.3 PG (ref 26.8–34.3)
MCHC RBC AUTO-ENTMCNC: 33.7 G/DL (ref 31.4–37.4)
MCV RBC AUTO: 87 FL (ref 82–98)
MONOCYTES # BLD AUTO: 0.59 THOUSAND/ÂΜL (ref 0.17–1.22)
MONOCYTES NFR BLD AUTO: 12 % (ref 4–12)
NEUTROPHILS # BLD AUTO: 2.84 THOUSANDS/ÂΜL (ref 1.85–7.62)
NEUTS SEG NFR BLD AUTO: 57 % (ref 43–75)
NITRITE UR QL STRIP: NEGATIVE
NON-SQ EPI CELLS URNS QL MICRO: ABNORMAL /HPF
NRBC BLD AUTO-RTO: 0 /100 WBCS
PH UR STRIP.AUTO: 7 [PH] (ref 4.5–8)
PLATELET # BLD AUTO: 209 THOUSANDS/UL (ref 149–390)
PMV BLD AUTO: 9.8 FL (ref 8.9–12.7)
POTASSIUM SERPL-SCNC: 3 MMOL/L (ref 3.5–5.3)
PROT SERPL-MCNC: 6.3 G/DL (ref 6.4–8.4)
PROT UR STRIP-MCNC: NEGATIVE MG/DL
RBC # BLD AUTO: 3.38 MILLION/UL (ref 3.81–5.12)
RBC #/AREA URNS AUTO: ABNORMAL /HPF
RSV RNA RESP QL NAA+PROBE: NEGATIVE
SARS-COV-2 RNA RESP QL NAA+PROBE: NEGATIVE
SODIUM SERPL-SCNC: 135 MMOL/L (ref 135–147)
SP GR UR STRIP.AUTO: 1.01 (ref 1–1.03)
UROBILINOGEN UR QL STRIP.AUTO: 0.2 E.U./DL
WBC # BLD AUTO: 4.96 THOUSAND/UL (ref 4.31–10.16)
WBC #/AREA URNS AUTO: ABNORMAL /HPF

## 2024-02-10 PROCEDURE — 71046 X-RAY EXAM CHEST 2 VIEWS: CPT

## 2024-02-10 PROCEDURE — 0241U HB NFCT DS VIR RESP RNA 4 TRGT: CPT | Performed by: EMERGENCY MEDICINE

## 2024-02-10 PROCEDURE — 83735 ASSAY OF MAGNESIUM: CPT | Performed by: EMERGENCY MEDICINE

## 2024-02-10 PROCEDURE — 96365 THER/PROPH/DIAG IV INF INIT: CPT

## 2024-02-10 PROCEDURE — 87086 URINE CULTURE/COLONY COUNT: CPT

## 2024-02-10 PROCEDURE — 80076 HEPATIC FUNCTION PANEL: CPT | Performed by: EMERGENCY MEDICINE

## 2024-02-10 PROCEDURE — 96375 TX/PRO/DX INJ NEW DRUG ADDON: CPT

## 2024-02-10 PROCEDURE — 81001 URINALYSIS AUTO W/SCOPE: CPT

## 2024-02-10 PROCEDURE — 83880 ASSAY OF NATRIURETIC PEPTIDE: CPT | Performed by: EMERGENCY MEDICINE

## 2024-02-10 PROCEDURE — 80048 BASIC METABOLIC PNL TOTAL CA: CPT | Performed by: EMERGENCY MEDICINE

## 2024-02-10 PROCEDURE — 93005 ELECTROCARDIOGRAM TRACING: CPT

## 2024-02-10 PROCEDURE — 99285 EMERGENCY DEPT VISIT HI MDM: CPT

## 2024-02-10 PROCEDURE — 84484 ASSAY OF TROPONIN QUANT: CPT | Performed by: EMERGENCY MEDICINE

## 2024-02-10 PROCEDURE — 85025 COMPLETE CBC W/AUTO DIFF WBC: CPT | Performed by: EMERGENCY MEDICINE

## 2024-02-10 PROCEDURE — 99285 EMERGENCY DEPT VISIT HI MDM: CPT | Performed by: EMERGENCY MEDICINE

## 2024-02-10 PROCEDURE — 36415 COLL VENOUS BLD VENIPUNCTURE: CPT | Performed by: EMERGENCY MEDICINE

## 2024-02-10 PROCEDURE — 87086 URINE CULTURE/COLONY COUNT: CPT | Performed by: EMERGENCY MEDICINE

## 2024-02-10 RX ORDER — FUROSEMIDE 20 MG/1
20 TABLET ORAL DAILY
Qty: 2 TABLET | Refills: 0 | Status: SHIPPED | OUTPATIENT
Start: 2024-02-10

## 2024-02-10 RX ORDER — CEFTRIAXONE 1 G/50ML
1000 INJECTION, SOLUTION INTRAVENOUS ONCE
Status: COMPLETED | OUTPATIENT
Start: 2024-02-10 | End: 2024-02-10

## 2024-02-10 RX ORDER — FUROSEMIDE 10 MG/ML
20 INJECTION INTRAMUSCULAR; INTRAVENOUS ONCE
Status: COMPLETED | OUTPATIENT
Start: 2024-02-10 | End: 2024-02-10

## 2024-02-10 RX ORDER — POTASSIUM CHLORIDE 20 MEQ/1
40 TABLET, EXTENDED RELEASE ORAL ONCE
Status: COMPLETED | OUTPATIENT
Start: 2024-02-10 | End: 2024-02-10

## 2024-02-10 RX ADMIN — POTASSIUM CHLORIDE 40 MEQ: 1500 TABLET, EXTENDED RELEASE ORAL at 22:26

## 2024-02-10 RX ADMIN — FUROSEMIDE 20 MG: 10 INJECTION, SOLUTION INTRAVENOUS at 20:27

## 2024-02-10 RX ADMIN — CEFTRIAXONE 1000 MG: 1 INJECTION, SOLUTION INTRAVENOUS at 20:28

## 2024-02-11 VITALS
BODY MASS INDEX: 44.8 KG/M2 | RESPIRATION RATE: 20 BRPM | WEIGHT: 244.93 LBS | SYSTOLIC BLOOD PRESSURE: 126 MMHG | TEMPERATURE: 99.1 F | HEART RATE: 92 BPM | DIASTOLIC BLOOD PRESSURE: 58 MMHG | OXYGEN SATURATION: 98 %

## 2024-02-11 LAB
ATRIAL RATE: 88 BPM
ATRIAL RATE: 97 BPM
P AXIS: 69 DEGREES
P AXIS: 72 DEGREES
PR INTERVAL: 146 MS
PR INTERVAL: 160 MS
QRS AXIS: 34 DEGREES
QRS AXIS: 40 DEGREES
QRSD INTERVAL: 100 MS
QRSD INTERVAL: 92 MS
QT INTERVAL: 376 MS
QT INTERVAL: 394 MS
QTC INTERVAL: 476 MS
QTC INTERVAL: 477 MS
T WAVE AXIS: 39 DEGREES
T WAVE AXIS: 46 DEGREES
VENTRICULAR RATE: 88 BPM
VENTRICULAR RATE: 97 BPM

## 2024-02-11 PROCEDURE — 93010 ELECTROCARDIOGRAM REPORT: CPT | Performed by: STUDENT IN AN ORGANIZED HEALTH CARE EDUCATION/TRAINING PROGRAM

## 2024-02-11 NOTE — ED PROVIDER NOTES
History  Chief Complaint   Patient presents with    Medical Problem     Pt reports currently dx with UTI but unable to afford medication to treat it. Also reports swelling in bilateral LE. Told to double dosage of current BP meds to see if it helps. Also reports cough and congestion for past couple days, worried about catching a cold. Denies SOB or chest pain. Also reports not currently taking antidepressants, supposed to be started on Wellbutrin but unable to afford it. Currently homeless living in her car.     Jeni is a 73-year-old female, currently undomiciled and living in her car here with multiple complaints.  She was recently diagnosed with a urinary tract infection.  She notes that she was prescribed an antibiotic but has been unable to afford it.  Also, as she has been living in her car she is developing lower extremity edema.  She denies shortness of breath or chest pain.  No fevers.  She does note that she is starting to have some burning with urination.      Medical Problem  Associated symptoms: no abdominal pain, no chest pain, no cough, no fever, no nausea, no rash, no shortness of breath, no sore throat and no vomiting        Prior to Admission Medications   Prescriptions Last Dose Informant Patient Reported? Taking?   albuterol (Ventolin HFA) 90 mcg/act inhaler   No No   Sig: Inhale 2 puffs every 6 (six) hours as needed for wheezing   clonazePAM (KlonoPIN) 0.5 mg tablet   Yes No   Sig: Take 0.5 mg by mouth daily as needed   guaifenesin-codeine (GUAIFENESIN AC) 100-10 MG/5ML liquid   No No   Sig: Take 5 mL by mouth 3 (three) times a day as needed for cough   hydrochlorothiazide (HYDRODIURIL) 12.5 mg tablet   Yes No   Sig: Take 12.5 mg by mouth daily   pantoprazole (PROTONIX) 20 mg tablet   Yes No   Sig: Take 20 mg by mouth 2 (two) times a day   rOPINIRole (REQUIP) 1 mg tablet   Yes No   Sig: TAKE ONE TABLET BY MOUTH TWICE A DAY AND TAKE TWO TABLETS BY MOUTH EVERY DAY AT BEDTIME   traMADol (ULTRAM) 50  mg tablet   Yes No   Sig: Take 50 mg by mouth 2 (two) times a day as needed   venlafaxine (EFFEXOR-XR) 150 mg 24 hr capsule   Yes No   Sig: Take 1 capsule by mouth daily      Facility-Administered Medications: None       Past Medical History:   Diagnosis Date    Asthma     COPD (chronic obstructive pulmonary disease) (HCC)     Hypertension     Palpitations        Past Surgical History:   Procedure Laterality Date    BACK SURGERY      CHOLECYSTECTOMY      HYSTERECTOMY         History reviewed. No pertinent family history.  I have reviewed and agree with the history as documented.    E-Cigarette/Vaping    E-Cigarette Use Never User      E-Cigarette/Vaping Substances    Nicotine No     THC No     CBD No     Flavoring No     Other No     Unknown No      Social History     Tobacco Use    Smoking status: Former     Types: Cigarettes    Smokeless tobacco: Never    Tobacco comments:     Pt reports she quit smoking on 2/7/2024   Vaping Use    Vaping status: Never Used   Substance Use Topics    Alcohol use: Not Currently     Comment: 1 a month    Drug use: Never       Review of Systems   Constitutional:  Negative for chills and fever.   HENT:  Negative for sore throat.    Eyes:  Negative for visual disturbance.   Respiratory:  Negative for cough and shortness of breath.    Cardiovascular:  Positive for leg swelling. Negative for chest pain and palpitations.   Gastrointestinal:  Negative for abdominal pain, nausea and vomiting.   Genitourinary:  Positive for dysuria. Negative for hematuria.   Musculoskeletal:  Negative for arthralgias and back pain.   Skin:  Negative for color change and rash.   Neurological:  Negative for syncope.   All other systems reviewed and are negative.      Physical Exam  Physical Exam  Vitals and nursing note reviewed.   Constitutional:       General: She is not in acute distress.     Appearance: She is well-developed.   HENT:      Head: Normocephalic and atraumatic.   Eyes:      Conjunctiva/sclera:  Conjunctivae normal.   Cardiovascular:      Rate and Rhythm: Normal rate and regular rhythm.      Heart sounds: No murmur heard.  Pulmonary:      Effort: Pulmonary effort is normal. No respiratory distress.      Breath sounds: Normal breath sounds.   Abdominal:      Palpations: Abdomen is soft.      Tenderness: There is no abdominal tenderness.   Musculoskeletal:         General: No swelling.      Cervical back: Neck supple.      Right lower leg: Edema present.      Left lower leg: Edema present.   Skin:     General: Skin is warm and dry.      Capillary Refill: Capillary refill takes less than 2 seconds.   Neurological:      General: No focal deficit present.      Mental Status: She is alert and oriented to person, place, and time.   Psychiatric:         Mood and Affect: Mood normal.         Vital Signs  ED Triage Vitals [02/10/24 1921]   Temperature Pulse Respirations Blood Pressure SpO2   99.1 °F (37.3 °C) 101 18 135/91 96 %      Temp Source Heart Rate Source Patient Position - Orthostatic VS BP Location FiO2 (%)   Oral Monitor Sitting Right arm --      Pain Score       No Pain           Vitals:    02/10/24 1921 02/10/24 2245   BP: 135/91 111/58   Pulse: 101 94   Patient Position - Orthostatic VS: Sitting Sitting         Visual Acuity      ED Medications  Medications   cefTRIAXone (ROCEPHIN) IVPB (premix in dextrose) 1,000 mg 50 mL (0 mg Intravenous Stopped 2/10/24 2100)   furosemide (LASIX) injection 20 mg (20 mg Intravenous Given 2/10/24 2027)   potassium chloride (Klor-Con M20) CR tablet 40 mEq (40 mEq Oral Given 2/10/24 2226)       Diagnostic Studies  Results Reviewed       Procedure Component Value Units Date/Time    HS Troponin I 2hr [[id number]]  (Normal) Collected: 02/10/24 2242    Lab Status: Final result Specimen: Blood from Arm, Right Updated: 02/10/24 2309     hs TnI 2hr 7 ng/L      Delta 2hr hsTnI -1 ng/L     Urine Microscopic [[id number]]  (Abnormal) Collected: 02/10/24 2251    Lab Status: Final result  Specimen: Urine, Clean Catch Updated: 02/10/24 2301     RBC, UA 1-2 /hpf      WBC, UA 20-30 /hpf      Epithelial Cells Occasional /hpf      Bacteria, UA None Seen /hpf     Urine culture [[id number]] Collected: 02/10/24 2251    Lab Status: In process Specimen: Urine, Clean Catch Updated: 02/10/24 2301    Urine culture [[id number]] Collected: 02/10/24 2253    Lab Status: In process Specimen: Urine, Clean Catch Updated: 02/10/24 2255    Urine Macroscopic, POC [[id number]]  (Abnormal) Collected: 02/10/24 2251    Lab Status: Final result Specimen: Urine Updated: 02/10/24 2252     Color, UA Yellow     Clarity, UA Clear     pH, UA 7.0     Leukocytes, UA Small     Nitrite, UA Negative     Protein, UA Negative mg/dl      Glucose, UA Negative mg/dl      Ketones, UA Negative mg/dl      Urobilinogen, UA 0.2 E.U./dl      Bilirubin, UA Negative     Occult Blood, UA Trace     Specific Gravity, UA 1.015    Narrative:      CLINITEK RESULT    HS Troponin I 4hr [[id number]]     Lab Status: No result Specimen: Blood     FLU/RSV/COVID - if FLU/RSV clinically relevant [[id number]]  (Normal) Collected: 02/10/24 2019    Lab Status: Final result Specimen: Nares from Nose Updated: 02/10/24 2121     SARS-CoV-2 Negative     INFLUENZA A PCR Negative     INFLUENZA B PCR Negative     RSV PCR Negative    Narrative:      FOR PEDIATRIC PATIENTS - copy/paste COVID Guidelines URL to browser: [email or web address]    SARS-CoV-2 assay is a Nucleic Acid Amplification assay intended for the  qualitative detection of nucleic acid from SARS-CoV-2 in nasopharyngeal  swabs. Results are for the presumptive identification of SARS-CoV-2 RNA.    Positive results are indicative of infection with SARS-CoV-2, the virus  causing COVID-19, but do not rule out bacterial infection or co-infection  with other viruses. Laboratories within the [place] and its  territories are required to report all positive results to  the appropriate  public health authorities. Negative results do not preclude SARS-CoV-2  infection and should not be used as the sole basis for treatment or other  patient management decisions. Negative results must be combined with  clinical observations, patient history, and epidemiological information.  This test has not been FDA cleared or approved.    This test has been authorized by FDA under an Emergency Use Authorization  (EUA). This test is only authorized for the duration of time the  declaration that circumstances exist justifying the authorization of the  emergency use of an in vitro diagnostic tests for detection of SARS-CoV-2  virus and/or diagnosis of COVID-19 infection under section 564(b)(1) of  the Act, 21 U.S.C. 360bbb-3(b)(1), unless the authorization is terminated  or revoked sooner. The test has been validated but independent review by FDA  and CLIA is pending.    Test performed using Cepheid GeneXpert: This RT-PCR assay targets N2,  a region unique to SARS-CoV-2. A conserved region in the E-gene was chosen  for pan-Sarbecovirus detection which includes SARS-CoV-2.    According to CMS-2020-01-R, this platform meets the definition of high-throughput technology.    Magnesium [[id number]]  (Normal) Collected: 02/10/24 2019    Lab Status: Final result Specimen: Blood from Arm, Right Updated: 02/10/24 2121     Magnesium 1.9 mg/dL     HS Troponin 0hr (reflex protocol) [[id number]]  (Normal) Collected: 02/10/24 2019    Lab Status: Final result Specimen: Blood from Arm, Right Updated: 02/10/24 2049     hs TnI 0hr 8 ng/L     B-Type Natriuretic Peptide(BNP) [[id number]]  (Normal) Collected: 02/10/24 2019    Lab Status: Final result Specimen: Blood from Arm, Right Updated: 02/10/24 2048     BNP 23 pg/mL     Basic metabolic panel [[id number]]  (Abnormal) Collected: 02/10/24 2019    Lab Status: Final result Specimen: Blood from Arm, Right Updated: 02/10/24 2041     Sodium 135 mmol/L      Potassium 3.0 mmol/L       Chloride 96 mmol/L      CO2 31 mmol/L      ANION GAP 8 mmol/L      BUN 8 mg/dL      Creatinine 0.96 mg/dL      Glucose 108 mg/dL      Calcium 9.0 mg/dL      eGFR 58 ml/min/1.73sq m     Narrative:      National Kidney Disease Foundation guidelines for Chronic Kidney Disease (CKD):     Stage 1 with normal or high GFR (GFR > 90 mL/min/1.73 square meters)    Stage 2 Mild CKD (GFR = 60-89 mL/min/1.73 square meters)    Stage 3A Moderate CKD (GFR = 45-59 mL/min/1.73 square meters)    Stage 3B Moderate CKD (GFR = 30-44 mL/min/1.73 square meters)    Stage 4 Severe CKD (GFR = 15-29 mL/min/1.73 square meters)    Stage 5 End Stage CKD (GFR <15 mL/min/1.73 square meters)  Note: GFR calculation is accurate only with a steady state creatinine    Hepatic function panel [[id number]]  (Abnormal) Collected: 02/10/24 2019    Lab Status: Final result Specimen: Blood from Arm, Right Updated: 02/10/24 2041     Total Bilirubin 0.49 mg/dL      Bilirubin, Direct 0.11 mg/dL      Alkaline Phosphatase 70 U/L      AST 14 U/L      ALT 8 U/L      Total Protein 6.3 g/dL      Albumin 3.6 g/dL     CBC and differential [[id number]]  (Abnormal) Collected: 02/10/24 2019    Lab Status: Final result Specimen: Blood from Arm, Right Updated: 02/10/24 2025     WBC 4.96 Thousand/uL      RBC 3.38 Million/uL      Hemoglobin 9.9 g/dL      Hematocrit 29.4 %      MCV 87 fL      MCH 29.3 pg      MCHC 33.7 g/dL      RDW 13.9 %      MPV 9.8 fL      Platelets 209 Thousands/uL      nRBC 0 /100 WBCs      Neutrophils Relative 57 %      Immat GRANS % 1 %      Lymphocytes Relative 27 %      Monocytes Relative 12 %      Eosinophils Relative 2 %      Basophils Relative 1 %      Neutrophils Absolute 2.84 Thousands/µL      Immature Grans Absolute 0.05 Thousand/uL      Lymphocytes Absolute 1.34 Thousands/µL      Monocytes Absolute 0.59 Thousand/µL      Eosinophils Absolute 0.11 Thousand/µL      Basophils Absolute 0.03 Thousands/µL                    XR chest 2 views     (Results Pending)              Procedures  Procedures         ED Course                                             Medical Decision Making  Amount and/or Complexity of Data Reviewed  Labs: ordered.  Radiology: ordered.    Risk  Prescription drug management.             Disposition  Final diagnoses:   None     ED Disposition       None          Follow-up Information    None         Patient's Medications   Discharge Prescriptions    No medications on file       No discharge procedures on file.    PDMP Review         Value Time User    PDMP Reviewed  Yes 10/4/2023  1:54 AM Jer Bravo PA-C            ED Provider  Electronically Signed by           ischemia.    Risk  Prescription drug management.             Disposition  Final diagnoses:   UTI (urinary tract infection)   Bilateral lower extremity edema     Time reflects when diagnosis was documented in both MDM as applicable and the Disposition within this note       Time User Action Codes Description Comment    2/10/2024 11:23 PM Franco Muñoz Add [N39.0] UTI (urinary tract infection)     2/10/2024 11:25 PM Franco Muñoz Add [R60.0] Bilateral lower extremity edema           ED Disposition       ED Disposition   Discharge    Condition   Stable    Date/Time   Sat Feb 10, 2024 6467    Comment   Jeniclovis Gutierrez discharge to home/self care.                   Follow-up Information       Follow up With Specialties Details Why Contact Info    Maryan Yo MD    [address]  [address]  [place] [address]  [phone]              Discharge Medication List as of 2/10/2024 11:25 PM        START taking these medications    Details   furosemide (LASIX) 20 mg tablet Take 1 tablet (20 mg total) by mouth daily, Starting Sat 2/10/2024, Normal           CONTINUE these medications which have NOT CHANGED    Details   albuterol (Ventolin HFA) 90 mcg/act inhaler Inhale 2 puffs every 6 (six) hours as needed for wheezing, Starting Wed 10/4/2023, Normal      clonazePAM (KlonoPIN) 0.5 mg tablet Take 0.5 mg by mouth daily as needed, Starting Fri 8/18/2023, Historical Med      guaifenesin-codeine (GUAIFENESIN AC) 100-10 MG/5ML liquid Take 5 mL by mouth 3 (three) times a day as needed for cough, Starting Wed 10/4/2023, Normal      hydrochlorothiazide (HYDRODIURIL) 12.5 mg tablet Take 12.5 mg by mouth daily, Starting Fri 7/7/2023, Historical Med      pantoprazole (PROTONIX) 20 mg tablet Take 20 mg by mouth 2 (two) times a day, Starting Wed 4/26/2023, Until Thu 4/25/2024, Historical Med      rOPINIRole (REQUIP) 1 mg tablet TAKE ONE TABLET BY MOUTH TWICE A DAY AND TAKE TWO TABLETS BY MOUTH EVERY DAY AT BEDTIME, Historical Med       traMADol (ULTRAM) 50 mg tablet Take 50 mg by mouth 2 (two) times a day as needed, Starting Fri 8/18/2023, Historical Med      venlafaxine (EFFEXOR-XR) 150 mg 24 hr capsule Take 1 capsule by mouth daily, Starting Thu 6/15/2023, Historical Med             No discharge procedures on file.    PDMP Review         Value Time User    PDMP Reviewed  Yes 10/4/2023  1:54 AM Jer Bravo PA-C            ED Provider  Electronically Signed by             Franco Muñoz MD  02/23/24 2866

## 2024-02-13 LAB
BACTERIA UR CULT: NORMAL
BACTERIA UR CULT: NORMAL

## 2024-03-05 ENCOUNTER — APPOINTMENT (EMERGENCY)
Dept: RADIOLOGY | Facility: HOSPITAL | Age: 74
End: 2024-03-05
Payer: MEDICARE

## 2024-03-05 ENCOUNTER — HOSPITAL ENCOUNTER (OUTPATIENT)
Facility: HOSPITAL | Age: 74
Setting detail: OBSERVATION
Discharge: HOME | End: 2024-03-07
Attending: EMERGENCY MEDICINE | Admitting: HOSPITALIST
Payer: MEDICARE

## 2024-03-05 DIAGNOSIS — M71.20 BAKER'S CYST: ICD-10-CM

## 2024-03-05 DIAGNOSIS — R60.1 ANASARCA: Primary | ICD-10-CM

## 2024-03-05 DIAGNOSIS — Z59.00 HOMELESSNESS: ICD-10-CM

## 2024-03-05 DIAGNOSIS — E87.6 HYPOKALEMIA: ICD-10-CM

## 2024-03-05 DIAGNOSIS — R60.0 BILATERAL LOWER EXTREMITY EDEMA: ICD-10-CM

## 2024-03-05 PROBLEM — J98.4 ACUTE PULMONARY INSUFFICIENCY: Status: RESOLVED | Noted: 2023-10-04 | Resolved: 2024-03-05

## 2024-03-05 PROBLEM — I87.2 CHRONIC VENOUS INSUFFICIENCY: Status: ACTIVE | Noted: 2024-03-05

## 2024-03-05 PROBLEM — I48.0 PAROXYSMAL ATRIAL FIBRILLATION (HCC): Status: ACTIVE | Noted: 2024-03-05

## 2024-03-05 LAB
ALBUMIN SERPL BCP-MCNC: 4.1 G/DL (ref 3.5–5)
ALP SERPL-CCNC: 56 U/L (ref 34–104)
ALT SERPL W P-5'-P-CCNC: 10 U/L (ref 7–52)
ANION GAP SERPL CALCULATED.3IONS-SCNC: 7 MMOL/L
ANION GAP SERPL CALCULATED.3IONS-SCNC: 8 MMOL/L
AST SERPL W P-5'-P-CCNC: 17 U/L (ref 13–39)
BASOPHILS # BLD AUTO: 0.04 THOUSANDS/ÂΜL (ref 0–0.1)
BASOPHILS NFR BLD AUTO: 1 % (ref 0–1)
BILIRUB SERPL-MCNC: 0.5 MG/DL (ref 0.2–1)
BNP SERPL-MCNC: 20 PG/ML (ref 0–100)
BUN SERPL-MCNC: 14 MG/DL (ref 5–25)
BUN SERPL-MCNC: 14 MG/DL (ref 5–25)
CALCIUM SERPL-MCNC: 9 MG/DL (ref 8.4–10.2)
CALCIUM SERPL-MCNC: 9.2 MG/DL (ref 8.4–10.2)
CHLORIDE SERPL-SCNC: 100 MMOL/L (ref 96–108)
CHLORIDE SERPL-SCNC: 99 MMOL/L (ref 96–108)
CO2 SERPL-SCNC: 29 MMOL/L (ref 21–32)
CO2 SERPL-SCNC: 31 MMOL/L (ref 21–32)
CREAT SERPL-MCNC: 0.94 MG/DL (ref 0.6–1.3)
CREAT SERPL-MCNC: 0.95 MG/DL (ref 0.6–1.3)
EOSINOPHIL # BLD AUTO: 0.22 THOUSAND/ÂΜL (ref 0–0.61)
EOSINOPHIL NFR BLD AUTO: 4 % (ref 0–6)
ERYTHROCYTE [DISTWIDTH] IN BLOOD BY AUTOMATED COUNT: 14 % (ref 11.6–15.1)
GFR SERPL CREATININE-BSD FRML MDRD: 59 ML/MIN/1.73SQ M
GFR SERPL CREATININE-BSD FRML MDRD: 60 ML/MIN/1.73SQ M
GLUCOSE P FAST SERPL-MCNC: 111 MG/DL (ref 65–99)
GLUCOSE SERPL-MCNC: 111 MG/DL (ref 65–140)
GLUCOSE SERPL-MCNC: 134 MG/DL (ref 65–140)
HCT VFR BLD AUTO: 30.8 % (ref 34.8–46.1)
HGB BLD-MCNC: 10.3 G/DL (ref 11.5–15.4)
IMM GRANULOCYTES # BLD AUTO: 0.02 THOUSAND/UL (ref 0–0.2)
IMM GRANULOCYTES NFR BLD AUTO: 0 % (ref 0–2)
LYMPHOCYTES # BLD AUTO: 1.59 THOUSANDS/ÂΜL (ref 0.6–4.47)
LYMPHOCYTES NFR BLD AUTO: 27 % (ref 14–44)
MCH RBC QN AUTO: 29.3 PG (ref 26.8–34.3)
MCHC RBC AUTO-ENTMCNC: 33.4 G/DL (ref 31.4–37.4)
MCV RBC AUTO: 88 FL (ref 82–98)
MONOCYTES # BLD AUTO: 0.51 THOUSAND/ÂΜL (ref 0.17–1.22)
MONOCYTES NFR BLD AUTO: 9 % (ref 4–12)
NEUTROPHILS # BLD AUTO: 3.63 THOUSANDS/ÂΜL (ref 1.85–7.62)
NEUTS SEG NFR BLD AUTO: 59 % (ref 43–75)
NRBC BLD AUTO-RTO: 0 /100 WBCS
PLATELET # BLD AUTO: 215 THOUSANDS/UL (ref 149–390)
PMV BLD AUTO: 10.3 FL (ref 8.9–12.7)
POTASSIUM SERPL-SCNC: 2.7 MMOL/L (ref 3.5–5.3)
POTASSIUM SERPL-SCNC: 3.1 MMOL/L (ref 3.5–5.3)
PROT SERPL-MCNC: 6.7 G/DL (ref 6.4–8.4)
RBC # BLD AUTO: 3.51 MILLION/UL (ref 3.81–5.12)
SODIUM SERPL-SCNC: 136 MMOL/L (ref 135–147)
SODIUM SERPL-SCNC: 138 MMOL/L (ref 135–147)
WBC # BLD AUTO: 6.01 THOUSAND/UL (ref 4.31–10.16)

## 2024-03-05 PROCEDURE — 97166 OT EVAL MOD COMPLEX 45 MIN: CPT

## 2024-03-05 PROCEDURE — 97163 PT EVAL HIGH COMPLEX 45 MIN: CPT | Performed by: PHYSICAL THERAPIST

## 2024-03-05 PROCEDURE — 36415 COLL VENOUS BLD VENIPUNCTURE: CPT

## 2024-03-05 PROCEDURE — 96375 TX/PRO/DX INJ NEW DRUG ADDON: CPT

## 2024-03-05 PROCEDURE — 71045 X-RAY EXAM CHEST 1 VIEW: CPT

## 2024-03-05 PROCEDURE — 83880 ASSAY OF NATRIURETIC PEPTIDE: CPT

## 2024-03-05 PROCEDURE — 85025 COMPLETE CBC W/AUTO DIFF WBC: CPT

## 2024-03-05 PROCEDURE — 96365 THER/PROPH/DIAG IV INF INIT: CPT

## 2024-03-05 PROCEDURE — 99284 EMERGENCY DEPT VISIT MOD MDM: CPT

## 2024-03-05 PROCEDURE — 80048 BASIC METABOLIC PNL TOTAL CA: CPT

## 2024-03-05 PROCEDURE — 80053 COMPREHEN METABOLIC PANEL: CPT

## 2024-03-05 PROCEDURE — 99285 EMERGENCY DEPT VISIT HI MDM: CPT | Performed by: EMERGENCY MEDICINE

## 2024-03-05 PROCEDURE — 99222 1ST HOSP IP/OBS MODERATE 55: CPT | Performed by: HOSPITALIST

## 2024-03-05 RX ORDER — HYDROCHLOROTHIAZIDE 12.5 MG/1
12.5 TABLET ORAL DAILY
Status: DISCONTINUED | OUTPATIENT
Start: 2024-03-05 | End: 2024-03-07 | Stop reason: HOSPADM

## 2024-03-05 RX ORDER — PANTOPRAZOLE SODIUM 20 MG/1
20 TABLET, DELAYED RELEASE ORAL 2 TIMES DAILY WITH MEALS
Status: DISCONTINUED | OUTPATIENT
Start: 2024-03-05 | End: 2024-03-07 | Stop reason: HOSPADM

## 2024-03-05 RX ORDER — BUPROPION HYDROCHLORIDE 100 MG/1
100 TABLET, EXTENDED RELEASE ORAL 2 TIMES DAILY
COMMUNITY
Start: 2024-02-06

## 2024-03-05 RX ORDER — ONDANSETRON 2 MG/ML
4 INJECTION INTRAMUSCULAR; INTRAVENOUS EVERY 6 HOURS PRN
Status: DISCONTINUED | OUTPATIENT
Start: 2024-03-05 | End: 2024-03-07 | Stop reason: HOSPADM

## 2024-03-05 RX ORDER — MAGNESIUM SULFATE HEPTAHYDRATE 40 MG/ML
2 INJECTION, SOLUTION INTRAVENOUS ONCE
Status: COMPLETED | OUTPATIENT
Start: 2024-03-05 | End: 2024-03-05

## 2024-03-05 RX ORDER — BUPROPION HYDROCHLORIDE 150 MG/1
150 TABLET ORAL DAILY
Status: DISCONTINUED | OUTPATIENT
Start: 2024-03-05 | End: 2024-03-07 | Stop reason: HOSPADM

## 2024-03-05 RX ORDER — MAGNESIUM HYDROXIDE/ALUMINUM HYDROXICE/SIMETHICONE 120; 1200; 1200 MG/30ML; MG/30ML; MG/30ML
30 SUSPENSION ORAL EVERY 6 HOURS PRN
Status: DISCONTINUED | OUTPATIENT
Start: 2024-03-05 | End: 2024-03-07 | Stop reason: HOSPADM

## 2024-03-05 RX ORDER — POLYETHYLENE GLYCOL 3350 17 G/17G
17 POWDER, FOR SOLUTION ORAL DAILY PRN
Status: DISCONTINUED | OUTPATIENT
Start: 2024-03-05 | End: 2024-03-07 | Stop reason: HOSPADM

## 2024-03-05 RX ORDER — TRAMADOL HYDROCHLORIDE 50 MG/1
50 TABLET ORAL EVERY 6 HOURS PRN
Status: DISCONTINUED | OUTPATIENT
Start: 2024-03-05 | End: 2024-03-07 | Stop reason: HOSPADM

## 2024-03-05 RX ORDER — POTASSIUM CHLORIDE 14.9 MG/ML
20 INJECTION INTRAVENOUS ONCE
Status: COMPLETED | OUTPATIENT
Start: 2024-03-05 | End: 2024-03-05

## 2024-03-05 RX ORDER — ROPINIROLE 1 MG/1
2 TABLET, FILM COATED ORAL
Status: DISCONTINUED | OUTPATIENT
Start: 2024-03-05 | End: 2024-03-07 | Stop reason: HOSPADM

## 2024-03-05 RX ORDER — ROPINIROLE 1 MG/1
1 TABLET, FILM COATED ORAL
Status: DISCONTINUED | OUTPATIENT
Start: 2024-03-06 | End: 2024-03-07 | Stop reason: HOSPADM

## 2024-03-05 RX ORDER — FUROSEMIDE 10 MG/ML
40 INJECTION INTRAMUSCULAR; INTRAVENOUS ONCE
Status: COMPLETED | OUTPATIENT
Start: 2024-03-05 | End: 2024-03-05

## 2024-03-05 RX ORDER — ROPINIROLE 1 MG/1
1 TABLET, FILM COATED ORAL
Status: DISCONTINUED | OUTPATIENT
Start: 2024-03-05 | End: 2024-03-05

## 2024-03-05 RX ORDER — POTASSIUM CHLORIDE 20 MEQ/1
40 TABLET, EXTENDED RELEASE ORAL ONCE
Status: COMPLETED | OUTPATIENT
Start: 2024-03-05 | End: 2024-03-05

## 2024-03-05 RX ORDER — FUROSEMIDE 20 MG/1
20 TABLET ORAL DAILY
Status: DISCONTINUED | OUTPATIENT
Start: 2024-03-05 | End: 2024-03-07 | Stop reason: HOSPADM

## 2024-03-05 RX ORDER — ACETAMINOPHEN 325 MG/1
650 TABLET ORAL EVERY 6 HOURS PRN
Status: DISCONTINUED | OUTPATIENT
Start: 2024-03-05 | End: 2024-03-07 | Stop reason: HOSPADM

## 2024-03-05 RX ORDER — MAGNESIUM SULFATE HEPTAHYDRATE 40 MG/ML
2 INJECTION, SOLUTION INTRAVENOUS ONCE
Status: DISCONTINUED | OUTPATIENT
Start: 2024-03-05 | End: 2024-03-05

## 2024-03-05 RX ADMIN — POTASSIUM CHLORIDE 40 MEQ: 1500 TABLET, EXTENDED RELEASE ORAL at 03:42

## 2024-03-05 RX ADMIN — ROPINIROLE 2 MG: 1 TABLET, FILM COATED ORAL at 20:42

## 2024-03-05 RX ADMIN — TRAMADOL HYDROCHLORIDE 50 MG: 50 TABLET, COATED ORAL at 23:31

## 2024-03-05 RX ADMIN — FUROSEMIDE 20 MG: 20 TABLET ORAL at 09:03

## 2024-03-05 RX ADMIN — PANTOPRAZOLE SODIUM 20 MG: 20 TABLET, DELAYED RELEASE ORAL at 16:16

## 2024-03-05 RX ADMIN — FUROSEMIDE 40 MG: 10 INJECTION, SOLUTION INTRAMUSCULAR; INTRAVENOUS at 03:18

## 2024-03-05 RX ADMIN — POTASSIUM CHLORIDE 20 MEQ: 14.9 INJECTION, SOLUTION INTRAVENOUS at 04:15

## 2024-03-05 RX ADMIN — MAGNESIUM SULFATE HEPTAHYDRATE 2 G: 40 INJECTION, SOLUTION INTRAVENOUS at 03:45

## 2024-03-05 RX ADMIN — TRAMADOL HYDROCHLORIDE 50 MG: 50 TABLET, COATED ORAL at 10:52

## 2024-03-05 RX ADMIN — SODIUM CHLORIDE 250 ML: 0.9 INJECTION, SOLUTION INTRAVENOUS at 04:30

## 2024-03-05 RX ADMIN — HYDROCHLOROTHIAZIDE 12.5 MG: 12.5 TABLET ORAL at 09:03

## 2024-03-05 RX ADMIN — APIXABAN 5 MG: 5 TABLET, FILM COATED ORAL at 17:01

## 2024-03-05 RX ADMIN — APIXABAN 5 MG: 5 TABLET, FILM COATED ORAL at 09:03

## 2024-03-05 RX ADMIN — SODIUM CHLORIDE 250 ML: 0.9 INJECTION, SOLUTION INTRAVENOUS at 05:51

## 2024-03-05 RX ADMIN — UMECLIDINIUM BROMIDE AND VILANTEROL TRIFENATATE 1 PUFF: 62.5; 25 POWDER RESPIRATORY (INHALATION) at 09:03

## 2024-03-05 RX ADMIN — TRAMADOL HYDROCHLORIDE 50 MG: 50 TABLET, COATED ORAL at 17:36

## 2024-03-05 RX ADMIN — PANTOPRAZOLE SODIUM 20 MG: 20 TABLET, DELAYED RELEASE ORAL at 09:03

## 2024-03-05 SDOH — ECONOMIC STABILITY - HOUSING INSECURITY: HOMELESSNESS UNSPECIFIED: Z59.00

## 2024-03-05 NOTE — OCCUPATIONAL THERAPY NOTE
Occupational Therapy Evaluation     Patient Name: Jeni Gutierrez  Today's Date: 3/5/2024  Problem List  Principal Problem:    Chronic venous insufficiency  Active Problems:    Anxiety and depression    Essential hypertension    Homelessness    Hypokalemia    Paroxysmal atrial fibrillation (HCC)    Past Medical History  Past Medical History:   Diagnosis Date    Asthma     COPD (chronic obstructive pulmonary disease) (HCC)     Hypertension     Palpitations      Past Surgical History  Past Surgical History:   Procedure Laterality Date    BACK SURGERY      CHOLECYSTECTOMY      HYSTERECTOMY           03/05/24 1011   OT Last Visit   OT Visit Date 03/05/24   Note Type   Note type Evaluation   Pain Assessment   Pain Assessment Tool 0-10   Pain Score 8   Pain Location/Orientation Orientation: Bilateral;Location: Knee  (L > R)   Restrictions/Precautions   Weight Bearing Precautions Per Order No   Other Precautions Fall Risk;Pain   Home Living   Type of Home Homeless  (Pt states she has been living in her car since Sept 2023. 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 (+) , (-) falls, (+) alone. Pt state she has been having more difficutly with LB ADLs.   Reciprocal Relationships Pt reports having local family but has limited social support   Service to Others Retired    Intrinsic  "Gratification watching tv   Subjective   Subjective \"My socks are digging in to my legs.\"   ADL   Where Assessed Edge of bed   Eating Assistance 6  Modified independent   Grooming Assistance 6  Modified Independent   UB Bathing Assistance 5  Supervision/Setup   LB Bathing Assistance 3  Moderate Assistance   UB Dressing Assistance 5  Supervision/Setup   LB Dressing Assistance 3  Moderate Assistance   Toileting Assistance  5  Supervision/Setup   Bed Mobility   Supine to Sit 5  Supervision   Sit to Supine 5  Supervision   Transfers   Sit to Stand 5  Supervision   Additional items Increased time required;Verbal cues   Stand to Sit 5  Supervision   Additional items Increased time required;Verbal cues   Functional Mobility   Functional Mobility 5  Supervision   Additional items Crutches   Balance   Static Sitting Good   Dynamic Sitting Fair +   Static Standing Fair   Dynamic Standing Fair -   Activity Tolerance   Activity Tolerance Patient tolerated treatment well   Medical Staff Made Aware nsg, PT Lizette   RUE Assessment   RUE Assessment WFL   RUE Strength   RUE Overall Strength Within Functional Limits - strength 5/5   LUE Assessment   LUE Assessment WFL   LUE Strength   LUE Overall Strength Within Functional Limits - strength 5/5   Hand Function   Gross Motor Coordination Functional   Fine Motor Coordination Functional   Sensation   Light Touch No apparent deficits   Proprioception   Proprioception No apparent deficits   Vision-Basic Assessment   Current Vision Other (Comment)  ((+) glasses ; pt reports currently not wearing her own glasses but is able to see good enough with the ones she has)   Vision - Complex Assessment   Acuity Able to read employee name badge without difficulty   Psychosocial   Psychosocial (WDL) WDL   Perception   Inattention/Neglect Appears intact   Cognition   Overall Cognitive Status WFL   Arousal/Participation Alert;Responsive;Cooperative   Attention Within functional limits   Orientation " Level Oriented X4   Memory Within functional limits   Following Commands Follows all commands and directions without difficulty   Assessment   Limitation Decreased ADL status;Decreased endurance;Decreased high-level ADLs;Decreased Safe judgement during ADL   Prognosis Good   Assessment Pt is a 73 y.o female who presented to the ED on 3/5/2024 with leg swelling. Pt admitted to the hospital with chronic venous insufficiency and hypokalemia. Pt with PMH of asthma, COPD, HTN, palpitations, anxiety, and homelessness. Pt states PTA she was independent with ADL/IADLs, transfers and functional mobility - recently using crutches due to leg swelling. (+) , (-) falls, (+) alone. Pt state she has been having more difficutly with LB ADLs. During OT initial evaluation pt demonstrate slight deficits with ADL status (I.e LB ADLs), functional balance, activity tolerance (currently fair = 15-20 mins), and functional mobility. Pt demonstrated the need for IP OT services focused on the above stated deficits. Will continue to see 1-5tx sessions. The patient's raw score on the -PAC Daily Activity Inpatient Short Form is 19. A raw score of greater than or equal to 19 suggests the patient may benefit from discharge to home. Please refer to the recommendation of the Occupational Therapist for safe discharge planning.   Goals   Patient Goals To feel better   STG Time Frame   (1-5tx sessions)   Short Term Goal #1 Pt will demonstrate proper use of AD (i.e crutches, walker, cane) 100% of the time to faciliate completion of ADLs.   Short Term Goal #2 Pt will increase activity tolerance to good (20-30mins) and standing tolerance to 2-3mins for ADL routine.   Short Term Goal  Pt will improve balance by 1/2 grade to facilitate completion of ADL tasks.   Long Term Goal #1 Pt will demonstrate bathing/dressing of UB at [place] and LB at supervision level for improved ADL status.   Plan   Treatment Interventions ADL retraining;Functional transfer  training;Endurance training;Patient/family training;Equipment evaluation/education;Compensatory technique education;Energy conservation   Goal Expiration Date 03/19/24   OT Treatment Day 0   OT Frequency Other (comment)  (1-5tx session)   Discharge Recommendation   Rehab Resource Intensity Level, OT III (Minimum Resource Intensity)  (OPPT pending progress)   AM-PAC Daily Activity Inpatient   Lower Body Dressing 2   Bathing 2   Toileting 3   Upper Body Dressing 4   Grooming 4   Eating 4   Daily Activity Raw Score 19   Daily Activity Standardized Score (Calc for Raw Score >=11) 40.22   AM-PAC Applied Cognition Inpatient   Following a Speech/Presentation 4   Understanding Ordinary Conversation 4   Taking Medications 4   Remembering Where Things Are Placed or Put Away 4   Remembering List of 4-5 Errands 4   Taking Care of Complicated Tasks 4   Applied Cognition Raw Score 24   Applied Cognition Standardized Score 62.21   Noy King

## 2024-03-05 NOTE — ED NOTES
Pt dropping to 88% on room air when falling asleep. RN placed on 2 L O2 NC per providers orders.      Stewart Junior RN  03/05/24 [id number]

## 2024-03-05 NOTE — ED PROVIDER NOTES
Chief Complaint   Patient presents with    Leg Swelling     Pt c/o b/l leg swelling and pain. Denies SOB.     History of Present Illness and Review of Systems   This is a 73 y.o. female with PMH significant for asthma, COPD, HTN, Afibb on Eliquis coming in today with complaint of leg swelling.  The patient states she has been having worsening leg swelling that has been acute on chronic.  She states she was only prescribed 2 Lasix pills and has not taken them for about a week. She has been on her other home meds including Eliuqis.  She is having difficulties walking and wearing shoes given the degree of her leg swelling.  No cough or hemoptysis.  She has no orthopnea or dyspnea on exertion.  No history of CHF.  No fevers or infectious symptoms.  No chest pain abdominal pain or episodes of syncope.  No other symptoms currently.    - No language barrier.     No other complaints for this encounter.    Remainder of ROS Reviewed and Non-Pertinent    Past Medical, Past Surgical History:    has a past medical history of Asthma, COPD (chronic obstructive pulmonary disease) (HCC), Hypertension, and Palpitations.   has a past surgical history that includes Back surgery; Hysterectomy; and Cholecystectomy.     Allergies:     Allergies   Allergen Reactions    Clarithromycin Diarrhea    Nabumetone Other (See Comments)     rash/PT TAKES MELOXICAM**    Hydroxychloroquine Rash    Iodinated Contrast Media Rash       Social and Family History:     Social History     Substance and Sexual Activity   Alcohol Use Not Currently    Comment: 1 a month     Social History     Tobacco Use   Smoking Status Former    Types: Cigarettes   Smokeless Tobacco Never   Tobacco Comments    Pt reports she quit smoking on 2/7/2024     Social History     Substance and Sexual Activity   Drug Use Never       Physical Examination     Vitals:    03/05/24 0445 03/05/24 0455 03/05/24 0500 03/05/24 0530   BP:   155/71 141/64   BP Location:   Left arm Left arm    Pulse:   85 86   Resp:   20 20   Temp:       TempSrc:       SpO2: (!) 88% 96% 95% 95%   Weight:           Physical Exam  Vitals and nursing note reviewed.   Constitutional:       General: She is not in acute distress.     Appearance: She is well-developed. She is ill-appearing.   HENT:      Head: Normocephalic and atraumatic.      Nose: Nose normal.      Mouth/Throat:      Mouth: Mucous membranes are moist.   Eyes:      Extraocular Movements: Extraocular movements intact.      Conjunctiva/sclera: Conjunctivae normal.   Cardiovascular:      Rate and Rhythm: Normal rate and regular rhythm.      Heart sounds: No murmur heard.  Pulmonary:      Effort: Pulmonary effort is normal. No respiratory distress.      Breath sounds: Normal breath sounds.   Abdominal:      Palpations: Abdomen is soft.      Tenderness: There is no abdominal tenderness.   Musculoskeletal:         General: No swelling.      Cervical back: Neck supple.      Right lower leg: Edema present.      Left lower leg: Edema present.   Skin:     General: Skin is warm and dry.      Capillary Refill: Capillary refill takes less than 2 seconds.   Neurological:      General: No focal deficit present.      Mental Status: She is alert.      Cranial Nerves: No cranial nerve deficit.      Motor: No weakness.   Psychiatric:         Mood and Affect: Mood normal.           Procedures   Procedures      MDM:   Medical Decision Making  Jeni Gutierrez is a 73 y.o. who presents with complaints of leg swelling    Vital signs are stable, afebrile, physical exam shows the patient has anasarca on exam, with pitting edema bilaterally, lungs are clear to auscultation, no abdominal tenderness, no evidence of rash or erythema or deformity or unilateral leg swelling    Ddx: Clinically concerning for anasarca. Additionally she has housing insecurity which is contributing to the symptoms.  She has no evidence of infection or deformity.  Also doubtful to be DVT given the bilateral  component.    Plan: Workup will include CBC, CMP, chest x-ray, diuresis, case management in the AM. Will monitor closely and reassess.    Reassessment/Disposition: Workup ultimately concerning for hypokalemia.  Given this and her fluid overload, recommend admission for diuresis and further care.    Discussed the patient's case with the SL service who agreed to admit the patient for further care and evaluation. The patient was stable throughout their ED course and admitted without complication while under my care.        Amount and/or Complexity of Data Reviewed  Labs: ordered.  Radiology: ordered and independent interpretation performed.    Risk  Prescription drug management.  Decision regarding hospitalization.        - Reviewed relevant past office visits/hospitalizations/procedures  -Obtained pertinent history that influenced decision making from the patient    ED Course as of 03/05/24 0545   Tue Mar 05, 2024   0333 Repleting K+      Final Dispo   Final Diagnosis:  1. Anasarca    2. Hypokalemia    3. Homelessness      Time reflects when diagnosis was documented in both MDM as applicable and the Disposition within this note       Time User Action Codes Description Comment    3/5/2024  4:14 AM Brennen Sim Add [R60.1] Anasarca     3/5/2024  4:14 AM Brennen Sim Add [E87.6] Hypokalemia     3/5/2024  4:50 AM Jer Bravo Add [Z59.00] Homelessness           ED Disposition       ED Disposition   Admit    Condition   Stable    Date/Time   Tue Mar 5, 2024  4:14 AM    Comment   Case was discussed with [facility] and the patient's admission status was agreed to be Admission Status: observation status to the service of Dr. Garduno .               Follow-up Information    None       Medications   potassium chloride 20 mEq IVPB (premix) (20 mEq Intravenous New Bag 3/5/24 3763)   sodium chloride 0.9 % bolus 250 mL (has no administration in time range)   furosemide (LASIX) injection 40 mg (40 mg Intravenous Given 3/5/24 0326)    potassium chloride (Klor-Con M20) CR tablet 40 mEq (40 mEq Oral Given 3/5/24 1530)   magnesium sulfate 2 g/50 mL IVPB (premix) 2 g (0 g Intravenous Stopped 3/5/24 8747)   sodium chloride 0.9 % bolus 250 mL (250 mL Intravenous New Bag 3/5/24 2139)       Risk Stratification Tools                Orders Placed This Encounter   Procedures    XR chest 1 view portable    CBC and differential    Comprehensive metabolic panel    Insert peripheral IV    Inpatient consult to Case Management    Place in Observation       Labs:     Labs Reviewed   CBC AND DIFFERENTIAL - Abnormal       Result Value Ref Range Status    WBC 6.01  4.31 - 10.16 Thousand/uL Final    RBC 3.51 (*) 3.81 - 5.12 Million/uL Final    Hemoglobin 10.3 (*) 11.5 - 15.4 g/dL Final    Hematocrit 30.8 (*) 34.8 - 46.1 % Final    MCV 88  82 - 98 fL Final    MCH 29.3  26.8 - 34.3 pg Final    MCHC 33.4  31.4 - 37.4 g/dL Final    RDW 14.0  11.6 - 15.1 % Final    MPV 10.3  8.9 - 12.7 fL Final    Platelets 215  149 - 390 Thousands/uL Final    nRBC 0  /100 WBCs Final    Neutrophils Relative 59  43 - 75 % Final    Immat GRANS % 0  0 - 2 % Final    Lymphocytes Relative 27  14 - 44 % Final    Monocytes Relative 9  4 - 12 % Final    Eosinophils Relative 4  0 - 6 % Final    Basophils Relative 1  0 - 1 % Final    Neutrophils Absolute 3.63  1.85 - 7.62 Thousands/µL Final    Immature Grans Absolute 0.02  0.00 - 0.20 Thousand/uL Final    Lymphocytes Absolute 1.59  0.60 - 4.47 Thousands/µL Final    Monocytes Absolute 0.51  0.17 - 1.22 Thousand/µL Final    Eosinophils Absolute 0.22  0.00 - 0.61 Thousand/µL Final    Basophils Absolute 0.04  0.00 - 0.10 Thousands/µL Final   COMPREHENSIVE METABOLIC PANEL - Abnormal    Sodium 136  135 - 147 mmol/L Final    Potassium 2.7 (*) 3.5 - 5.3 mmol/L Final    Chloride 99  96 - 108 mmol/L Final    CO2 29  21 - 32 mmol/L Final    ANION GAP 8  mmol/L Final    BUN 14  5 - 25 mg/dL Final    Creatinine 0.95  0.60 - 1.30 mg/dL Final    Comment:  Standardized to IDMS reference method    Glucose 134  65 - 140 mg/dL Final    Comment: If the patient is fasting, the ADA then defines impaired fasting glucose as > 100 mg/dL and diabetes as > or equal to 123 mg/dL.    Calcium 9.2  8.4 - 10.2 mg/dL Final    AST 17  13 - 39 U/L Final    ALT 10  7 - 52 U/L Final    Comment: Specimen collection should occur prior to Sulfasalazine administration due to the potential for falsely depressed results.     Alkaline Phosphatase 56  34 - 104 U/L Final    Total Protein 6.7  6.4 - 8.4 g/dL Final    Albumin 4.1  3.5 - 5.0 g/dL Final    Total Bilirubin 0.50  0.20 - 1.00 mg/dL Final    Comment: Use of this assay is not recommended for patients undergoing treatment with eltrombopag due to the potential for falsely elevated results.  N-acetyl-p-benzoquinone imine (metabolite of Acetaminophen) will generate erroneously low results in samples for patients that have taken an overdose of Acetaminophen.    eGFR 59  ml/min/1.73sq m Final    Narrative:     National Kidney Disease Foundation guidelines for Chronic Kidney Disease (CKD):     Stage 1 with normal or high GFR (GFR > 90 mL/min/1.73 square meters)    Stage 2 Mild CKD (GFR = 60-89 mL/min/1.73 square meters)    Stage 3A Moderate CKD (GFR = 45-59 mL/min/1.73 square meters)    Stage 3B Moderate CKD (GFR = 30-44 mL/min/1.73 square meters)    Stage 4 Severe CKD (GFR = 15-29 mL/min/1.73 square meters)    Stage 5 End Stage CKD (GFR <15 mL/min/1.73 square meters)  Note: GFR calculation is accurate only with a steady state creatinine       Imaging:     XR chest 1 view portable   ED Interpretation by Brennen Sim MD (03/05 0321)   No evidence of acute cardiopulmonary abnormality by my independent interpretation            All details of the evaluation and treatment plan were made clear and additionally all questions and concerns were addressed while under my care.    Portions of the record may have been created with voice recognition  "software. Occasional wrong word or \"sound a like\" substitutions may have occurred due to the inherent limitations of voice recognition software. Read the chart carefully and recognize, using context, where substitutions have occurred.    The attending physician physically available and evaluated the above patient alongside myself.      Brennen Sim MD  03/05/24 0545    "

## 2024-03-05 NOTE — PLAN OF CARE
Problem: OCCUPATIONAL THERAPY ADULT  Goal: Performs self-care activities at highest level of function for planned discharge setting.  See evaluation for individualized goals.  Description: Treatment Interventions: ADL retraining, Functional transfer training, Endurance training, Patient/family training, Equipment evaluation/education, Compensatory technique education, Energy conservation          See flowsheet documentation for full assessment, interventions and recommendations.   Note: Limitation: Decreased ADL status, Decreased endurance, Decreased high-level ADLs, Decreased Safe judgement during ADL  Prognosis: Good  Assessment: Pt is a 73 y.o female who presented to the ED on 3/5/2024 with leg swelling. Pt admitted to the hospital with chronic venous insufficiency and hypokalemia. Pt with PMH of asthma, COPD, HTN, palpitations, anxiety, and homelessness. Pt states PTA she was independent with ADL/IADLs, transfers and functional mobility - recently using crutches due to leg swelling. (+) , (-) falls, (+) alone. Pt state she has been having more difficutly with LB ADLs. During OT initial evaluation pt demonstrate slight deficits with ADL status (I.e LB ADLs), functional balance, activity tolerance (currently fair = 15-20 mins), and functional mobility. Pt demonstrated the need for IP OT services focused on the above stated deficits. Will continue to see 1-5tx sessions. The patient's raw score on the AM-PAC Daily Activity Inpatient Short Form is 19. A raw score of greater than or equal to 19 suggests the patient may benefit from discharge to home. Please refer to the recommendation of the Occupational Therapist for safe discharge planning.     Rehab Resource Intensity Level, OT: III (Minimum Resource Intensity) (OPPT pending progress)

## 2024-03-05 NOTE — PHYSICAL THERAPY NOTE
PT EVALUATION    Pt. Name: Jeni Gutierrez  Pt. Age: 73 y.o.  MRN: [id number]  LENGTH OF STAY: 0    Patient Active Problem List   Diagnosis    Anxiety and depression    Essential hypertension    RLS (restless legs syndrome)    Homelessness    Hypokalemia    Chronic venous insufficiency    Paroxysmal atrial fibrillation (HCC)       Admitting Diagnoses:   Hypokalemia [E87.6]  Anasarca [R60.1]  Leg swelling [M79.89]  Homelessness [Z59.00]    Past Medical History:   Diagnosis Date    Asthma     COPD (chronic obstructive pulmonary disease) (HCC)     Hypertension     Palpitations        Past Surgical History:   Procedure Laterality Date    BACK SURGERY      CHOLECYSTECTOMY      HYSTERECTOMY         Imaging Studies:  XR chest 1 view portable   ED Interpretation by Brennen Sim MD (03/05 0321)   No evidence of acute cardiopulmonary abnormality by my independent interpretation         Final Result by Christopher Fan MD (03/05 0926)      No acute cardiopulmonary disease.            Workstation performed: [id number]              03/05/24 1033   PT Last Visit   PT Visit Date 03/05/24   Note Type   Note type Evaluation   Pain Assessment   Pain Assessment Tool 0-10   Pain Score 8   Pain Location/Orientation Orientation: Bilateral;Location: Leg   Hospital Pain Intervention(s) Repositioned;Ambulation/increased activity;Elevated;Emotional support;Rest   Restrictions/Precautions   Weight Bearing Precautions Per Order No   Other Precautions Fall Risk;Pain   Home Living   Type of Home Homeless   Home Equipment Walker;Crutches   Additional Comments PTA, pt homeless living out of her car since September 2023.   Prior Function   Level of [place] Independent with ADLs;Independent with functional mobility;Independent with IADLS  (w/ B/L axillary crutches)   Lives With Alone   Receives Help From Family  (local family however not supportive and unable to provide assistance)   IADLs Independent with driving;Independent  with meal prep;Independent with medication management   Falls in the last 6 months 0   General   Family/Caregiver Present No   Cognition   Overall Cognitive Status WFL   Arousal/Participation Alert   Attention Within functional limits   Orientation Level Oriented X4   Following Commands Follows all commands and directions without difficulty   Comments cooperative and engages in appropriate conversation   Subjective   Subjective I am looking for housing   RUE Assessment   RUE Assessment   (refer to OT)   LUE Assessment   LUE Assessment   (refer to OT)   RLE Assessment   RLE Assessment WFL  (4/5 grossly)   LLE Assessment   LLE Assessment WFL  (4/5 grossly)   Light Touch   RLE Light Touch Impaired   RLE Light Touch Comments N/T foot   LLE Light Touch Impaired   LLE Light Touch Comments N/T foot   Bed Mobility   Supine to Sit 5  Supervision   Additional items HOB elevated   Sit to Supine 5  Supervision   Additional items HOB elevated   Transfers   Sit to Stand 5  Supervision   Additional items Increased time required  (w/ axillary crutches)   Stand to Sit 5  Supervision   Additional items Increased time required  (w/ axillary crutches)   Ambulation/Elevation   Gait pattern Wide IVONNE;Short stride;Excessively slow   Gait Assistance 5  Supervision   Additional items Verbal cues   Assistive Device Axillary crutches   Distance 40'x1   Ambulation/Elevation Additional Comments demonstrated proper technique and safety   Balance   Static Sitting Normal   Dynamic Sitting Good   Static Standing Fair +  (w/ B/L axillary crutches)   Dynamic Standing Fair  (w/ B/L axillary crutches)   Ambulatory Fair  (w/ B/L axillary crutches)   Endurance Deficit   Endurance Deficit Yes   Endurance Deficit Description pain   Activity Tolerance   Activity Tolerance Patient tolerated treatment well   Medical Staff Made Aware OT student JESÚS Villafuerte   Nurse Made Aware BINH Vasques   Assessment   Prognosis Good   Problem List   (PT eval only)   Assessment  Pt. 73 y.o.female presents with B/L LE swelling. Past medical hx includes  asthma, COPD, HTN, Afib. Pt admitted for Chronic venous insufficiency w/ Hypokalemia (E87.6)  Anasarca (R60.1)  Leg swelling (M79.89)  Homelessness (Z59.00). Pt referred to PT for functional mobility evaluation & D/C planning w/ orders of up & OOB as tolerated. PTA, pt reports being  independent with B/L axillary crutches . Pain 8/10 in B/L LE. Pt currently homeless living out of her car. Required S for supine<>sit, sit<>stand, and ambulation. Pt was able to ambulate 40' with bilateral axillary crutches in room. 4 point gait with inc reliance on AD 2* LE pain. No gross LOB noted. Pt appears to be functioning close to baseline status with no acute PT needs. Subjective report of no PT needs currently. Pt would benefit from restorative therapy for daily ambulation to maintain current level of function. Based on pt presentation no rehab needs at D/C. The patient's AM-[facility] Basic Mobility Inpatient Short Form Raw Score is 21. A Raw score of greater than 16 suggests the patient may benefit from discharge to home. Please also refer to the recommendation of the Physical Therapist for safe discharge planning. CM to follow. Co-eval performed to complete this PT evaluation for the pts best interest given pts medical complexity and functional level. Will D/C PT. Re-consult if change in functional status.   Goals   Patient Goals to have less pain and swelling   Plan   Treatment/Interventions   (PT eval only)   PT Frequency   (D/C PT)   Discharge Recommendation   Rehab Resource Intensity Level, PT No post-acute rehabilitation needs   Equipment Recommended Crutches  (pt owns)   Additional Comments pt would benefit from restorative therapy for daily ambulation   AM-[facility] Basic Mobility Inpatient   Turning in Flat Bed Without Bedrails 4   Lying on Back to Sitting on Edge of Flat Bed Without Bedrails 4   Moving Bed to Chair 4   Standing Up From Chair Using Arms 3    Walk in Room 3   Climb 3-5 Stairs With Railing 3   Basic Mobility Inpatient Raw Score 21   Basic Mobility Standardized Score 45.55   Highest Level Of Mobility   -HLM Goal 6: Walk 10 steps or more   -HLM Achieved 7: Walk 25 feet or more   End of Consult   Patient Position at End of Consult Supine;All needs within reach   Hx/personal factors: co-morbidities, use of AD, pain, fall risk, and obesity, coping styles, social background, past experience, behavior pattern  Examination: risk for falls, pain, assessed body system, balance, endurance, amb, D/C disposition & fall risk, assessed cognition  Clinical: unpredictable (ongoing medical status, abnormal lab values, risk for falls, and pain mgt)  Complexity: high      Lizette Welch, PT

## 2024-03-05 NOTE — ED ATTENDING ATTESTATION
3/5/2024  IThaddeus Jr, DO, saw and evaluated the patient. I have discussed the patient with the resident/non-physician practitioner and agree with the resident's/non-physician practitioner's findings, Plan of Care, and MDM as documented in the resident's/non-physician practitioner's note, except where noted. All available labs and Radiology studies were reviewed.  I was present for key portions of any procedure(s) performed by the resident/non-physician practitioner and I was immediately available to provide assistance.       At this point I agree with the current assessment done in the Emergency Department.  I have conducted an independent evaluation of this patient a history and physical is as follows:    Final Diagnosis:  1. Anasarca    2. Hypokalemia            MDM     Patient presents with lower extremity pain and edema.  This is a chronic problem that the patient relates to being homeless and living in her car.  Was seen by cardiology on February 14 and was given Lasix to use but she states she only received a few pills and now has no diuretic.  She is still taking the rest of her other medications including her Eliquis for previous diagnosis of DVT.  She has no complaints of chest pain, shortness of breath, fever, chills or overlying rash.    Patient does have 4+ pitting edema without signs of overlying cellulitis.  Clinically she does not appear to be in acute CHF with pulmonary edema.  She is slightly tachycardic and her oxygen level is 95%.  We will obtain labs to further investigate, give a dose of IV Lasix and disposition depending on results.    Labs noted for a hypokalemia of 2.7.  Lasix given here which could potentially drop the slightly.  Will replace.  Will admit for monitoring, repeat labs, replacement and medication adjustment.      Lab Results:   Abnormal Labs Reviewed   CBC AND DIFFERENTIAL - Abnormal; Notable for the following components:       Result Value    RBC 3.51 (*)      Hemoglobin 10.3 (*)     Hematocrit 30.8 (*)     All other components within normal limits   COMPREHENSIVE METABOLIC PANEL - Abnormal; Notable for the following components:    Potassium 2.7 (*)     All other components within normal limits    Narrative:     National Kidney Disease Foundation guidelines for Chronic Kidney Disease (CKD):     Stage 1 with normal or high GFR (GFR > 90 mL/min/1.73 square meters)    Stage 2 Mild CKD (GFR = 60-89 mL/min/1.73 square meters)    Stage 3A Moderate CKD (GFR = 45-59 mL/min/1.73 square meters)    Stage 3B Moderate CKD (GFR = 30-44 mL/min/1.73 square meters)    Stage 4 Severe CKD (GFR = 15-29 mL/min/1.73 square meters)    Stage 5 End Stage CKD (GFR <15 mL/min/1.73 square meters)  Note: GFR calculation is accurate only with a steady state creatinine     Lab Results: I have personally reviewed pertinent lab results.    Imaging:   XR chest 1 view portable   ED Interpretation   No evidence of acute cardiopulmonary abnormality by my independent interpretation           I have personally reviewed pertinent reports.    EKG, Pathology, and Other Studies: I have personally reviewed pertinent films in PACS    Clinical Impression:    Final diagnoses:   Anasarca   Hypokalemia         Disposition    admitted to the hospital           New Prescriptions:    New Prescriptions    No medications on file            Follow-up Instructions:    No follow-up provider specified.        History of Present Illness   Jeni Gutierrez is a 73 y.o. female who presents with Leg Swelling (Pt c/o b/l leg swelling and pain. Denies SOB.)    has a past medical history of Asthma, COPD (chronic obstructive pulmonary disease) (HCC), Hypertension, and Palpitations..         Objective     Vitals:    03/05/24 0233 03/05/24 0315 03/05/24 0330 03/05/24 0400   BP: 139/62 135/64 134/58 159/70   BP Location: Right arm Left arm Left arm Left arm   Pulse: 105 86 86 86   Resp: 20 20 20 20   Temp: 98 °F (36.7 °C)      TempSrc: Oral       SpO2: 95% 94% 97% 93%   Weight: 112 kg (246 lb 11.1 oz)        Body mass index is 45.12 kg/m².  No intake or output data in the 24 hours ending 03/05/24 0416  Invasive Devices       Peripheral Intravenous Line  Duration             Peripheral IV 03/05/24 Right Wrist <1 day                    ED Course         Critical Care Time  Procedures

## 2024-03-05 NOTE — ASSESSMENT & PLAN NOTE
Worsening LE edema, no dyspnea on exertion/orthopnea. Less likely CHF  CXR reviewed: no acute cardiopulmonary disease   TTE 03/19/2023 reviewed: LVEF 55-60%, grade I diastolic dysfunction    Plan:  Check standing weight baseline appears to be around 240-245lbs  Check BNP  Continue Lasix 20mg daily, HCTZ 12.5mg daily  Elevate lower extremities, ACE wrap   PT/OT

## 2024-03-05 NOTE — CASE MANAGEMENT
Case Management Assessment & Discharge Planning Note    Patient name Jeni Gutierrez  Location [facility] 2 /South 2 M* MRN [id number]  : 1950 Date 3/5/2024       Current Admission Date: 3/5/2024  Current Admission Diagnosis:Chronic venous insufficiency   Patient Active Problem List    Diagnosis Date Noted    Chronic venous insufficiency 2024    Paroxysmal atrial fibrillation (HCC) 2024    RLS (restless legs syndrome) 10/04/2023    Homelessness 10/04/2023    Hypokalemia 10/04/2023    Essential hypertension 2013    Anxiety and depression 2009      LOS (days): 0  Geometric Mean LOS (GMLOS) (days):   Days to GMLOS:     OBJECTIVE:              Current admission status: Observation       Preferred Pharmacy:   [facility] [address] [address]  Phone: [phone] Fax: [phone]    Primary Care Provider: Maryan Yo MD    Primary Insurance: FoxyP2  Secondary Insurance: MEDICARE    ASSESSMENT:  Active Health Care Proxies    There are no active Health Care Proxies on file.       Advance Directives  Does patient have a Health Care POA?: No  Does patient have Advance Directives?: No    Readmission Root Cause  30 Day Readmission: No    Patient Information  Admitted from:: Other (comment) (Pt is currently homeless and is living in her car)  Mental Status: Alert  During Assessment patient was accompanied by: Not accompanied during assessment  Assessment information provided by:: Patient  Primary Caregiver: Self  Support Systems: Self, /, Family members  County of Residence: [place]  What city do you live in?: [place]  Type of Current Residence: Homeless  Living Arrangements: Other (Comment) (Pt is homeless and on her own)  Is patient a ?: No    Activities of Daily Living Prior to Admission  Functional Status: Independent  Completes ADLs independently?: Yes  Ambulates independently?:  Yes  Does patient use assisted devices?: Yes  Assisted Devices (DME) used: Crutches  Does patient currently own DME?: Yes  What DME does the patient currently own?: Crutches, Rollator  Does patient have a history of Outpatient Therapy (PT/OT)?: No  Does the patient have a history of Short-Term Rehab?: No  Does patient have a history of HHC?: No  Does patient currently have HHC?: No    Patient Information Continued  Income Source: SSI/SSD (Pt gets $1,500/month)  Does patient have prescription coverage?: Yes  Does patient receive dialysis treatments?: No  Does patient have a history of substance abuse?: No  Does patient have a history of Mental Health Diagnosis?: Yes (Anxiety, Depression)  Is patient receiving treatment for mental health?: Yes (Pt reports she just started therapy but has not had a session yet)  Has patient received inpatient treatment related to mental health in the last 2 years?: No    Means of Transportation  Means of Transport to Appts:: Drives Self    Social Determinants of Health (SDOH)      Flowsheet Row Most Recent Value   Housing Stability    In the last 12 months, was there a time when you were not able to pay the mortgage or rent on time? Y   In the last 12 months, was there a time when you did not have a steady place to sleep or slept in a shelter (including now)? Y  [shelter, homeless in car ([employer])]   Transportation Needs    In the past 12 months, has lack of transportation kept you from medical appointments or from getting medications? yes   In the past 12 months, has lack of transportation kept you from meetings, work, or from getting things needed for daily living? No   Food Insecurity    Within the past 12 months, you worried that your food would run out before you got the money to buy more. Sometimes   Within the past 12 months, the food you bought just didn't last and you didn't have money to get more. Sometimes   Utilities    In the past 12 months has the electric, gas, oil, or  "water company threatened to shut off services in your home? No            DISCHARGE DETAILS:    Discharge planning discussed with:: Patient  Freedom of Choice: Yes     CM contacted family/caregiver?: No- see comments (Pt did not identify family/caregiver to contact right now)  Were Treatment Team discharge recommendations reviewed with patient/caregiver?: Yes  Did patient/caregiver verbalize understanding of patient care needs?: Yes  Were patient/caregiver advised of the risks associated with not following Treatment Team discharge recommendations?: Yes    Contacts  Patient Contacts: Pt in process of identifying a different emergency contact.    Requested Home Health Care         Is the patient interested in HHC at discharge?: No    DME Referral Provided  Referral made for DME?: No    Additional Comments: CM consulted for outpatient resources and social issues. CM met with Pt at bedside to complete assessment and discharge planning. Pt was alert and engaged during the assessment.  Pt reports that she is homeless and currently is living in her car, alone. Pt did share that she is on several waitlists for housing (including section 8 and [facility]). Pt was living in an apartment in public housing prior to being homeless and shared that she was evicted due to being behing in rent. Pt also shared that she signed up for an \"alliance\" program through aging which is a program where she would be able to rent a room. However, she reports she still has not heard anything. Pt reports that she functions and ambulates independently. Pt reports she does use cruches and she has a rollator in the trunk of her car but \"she cannot get to it due to other things in her trunk\". Pt does drive. Pt reports she has an OP CM through [facility] and reports that the OP CM has been helping her with the needs she has. At time of assessment PT/OT eval and recommendations were still pending. Pt did ask CM for a list of food [place] in [place]. CM " provided Pt with list of food [place] in [place]. CM will continue to follow.

## 2024-03-05 NOTE — H&P
[facility]  H&P  Name: Jeni Gutierrez 73 y.o. female I MRN: [id number]  Unit/Bed#: ED-10 I Date of Admission: 3/5/2024   Date of Service: 3/5/2024 I Hospital Day: 0      Assessment/Plan   * Chronic venous insufficiency  Assessment & Plan  Worsening LE edema, no dyspnea on exertion/orthopnea. Less likely CHF  CXR reviewed: no acute cardiopulmonary disease   TTE 03/19/2023 reviewed: LVEF 55-60%, grade I diastolic dysfunction    Plan:  Check standing weight baseline appears to be around 240-245lbs  Check BNP  Continue Lasix 20mg daily, HCTZ 12.5mg daily  Elevate lower extremities, ACE wrap   PT/OT    Paroxysmal atrial fibrillation (HCC)  Assessment & Plan  Rate controlled    Plan:  Rate/rhythm control: none  Anticoagulation: eliquis    Hypokalemia  Assessment & Plan  K 2.7    Plan:  Replenish     Homelessness  Assessment & Plan  Case management consulted    Essential hypertension  Assessment & Plan  Hypertensive    Plan:  Continue HCTZ    Anxiety and depression  Assessment & Plan  Continue bupropion     VTE Pharmacologic Prophylaxis: VTE Score: 3 Moderate Risk (Score 3-4) - Pharmacological DVT Prophylaxis Ordered: apixaban (Eliquis).  Code Status: Prior   Discussion with family:     Anticipated Length of Stay: Patient will be admitted on an observation basis with an anticipated length of stay of less than 2 midnights secondary to edema.    Total Time Spent on Date of Encounter in care of patient:  mins. This time was spent on one or more of the following: performing physical exam; counseling and coordination of care; obtaining or reviewing history; documenting in the medical record; reviewing/ordering tests, medications or procedures; communicating with other healthcare professionals and discussing with patient's family/caregivers.    Chief Complaint: leg swelling    History of Present Illness:  Jeni Gutierrez is a 73 y.o. female with a PMH of HTN, PAF on eliquis, anxiety, homelessness who  presents with leg swelling.   History obtained from patient, chart review and discussion with ED resident. She presents tonight for evaluation of leg swelling. Patient states the swelling has been going on for months since she was evicted and has been living in her car. She was seen by a doctor last month and started on Lasix to take. She has ran out of the medication. Reports that she has been taking all other medications including Eliquis. Denies chest pain, shortness of breath, palpitations. Having a hard time walking/putting on shoes due to the swelling.     Review of Systems:  Review of Systems   Constitutional:  Negative for chills and fever.   Respiratory:  Negative for shortness of breath.    Cardiovascular:  Positive for leg swelling. Negative for chest pain and palpitations.   Gastrointestinal:  Negative for abdominal pain, constipation, diarrhea and nausea.   Neurological:  Negative for syncope.   All other systems reviewed and are negative.      Past Medical and Surgical History:   Past Medical History:   Diagnosis Date    Asthma     COPD (chronic obstructive pulmonary disease) (HCC)     Hypertension     Palpitations        Past Surgical History:   Procedure Laterality Date    BACK SURGERY      CHOLECYSTECTOMY      HYSTERECTOMY         Meds/Allergies:  Prior to Admission medications    Medication Sig Start Date End Date Taking? Authorizing Provider   apixaban (ELIQUIS) 5 mg Take 5 mg by mouth 2 (two) times a day 10/25/23 10/24/24 Yes Historical Provider, MD   buPROPion (WELLBUTRIN SR) 100 mg 12 hr tablet Take 100 mg by mouth 2 (two) times a day 2/6/24  Yes Historical Provider, MD   umeclidinium-vilanterol 62.5-25 mcg/actuation inhaler Inhale 1 puff daily 10/25/23  Yes Historical Provider, MD   albuterol (Ventolin HFA) 90 mcg/act inhaler Inhale 2 puffs every 6 (six) hours as needed for wheezing 10/4/23   Mt Santizo,    clonazePAM (KlonoPIN) 0.5 mg tablet Take 0.5 mg by mouth daily as needed 8/18/23    Historical Provider, MD   furosemide (LASIX) 20 mg tablet Take 1 tablet (20 mg total) by mouth daily 2/10/24   Franco Muñoz MD   guaifenesin-codeine (GUAIFENESIN AC) 100-10 MG/5ML liquid Take 5 mL by mouth 3 (three) times a day as needed for cough 10/4/23   Mt Santizo DO   hydrochlorothiazide (HYDRODIURIL) 12.5 mg tablet Take 12.5 mg by mouth daily 7/7/23   Historical Provider, MD   pantoprazole (PROTONIX) 20 mg tablet Take 20 mg by mouth 2 (two) times a day 4/26/23 4/25/24  Historical Provider, MD   rOPINIRole (REQUIP) 1 mg tablet TAKE ONE TABLET BY MOUTH TWICE A DAY AND TAKE TWO TABLETS BY MOUTH EVERY DAY AT BEDTIME 5/10/23   Historical Provider, MD   traMADol (ULTRAM) 50 mg tablet Take 50 mg by mouth 2 (two) times a day as needed 8/18/23   Historical Provider, MD   venlafaxine (EFFEXOR-XR) 150 mg 24 hr capsule Take 1 capsule by mouth daily 6/15/23   Historical Provider, MD JONES have reviewed home medications with patient personally.    Allergies:   Allergies   Allergen Reactions    Clarithromycin Diarrhea    Nabumetone Other (See Comments)     rash/PT TAKES MELOXICAM**    Hydroxychloroquine Rash    Iodinated Contrast Media Rash       Social History:  Marital Status: Single   Occupation:   Patient Pre-hospital Living Situation: Alone  Patient Pre-hospital Level of Mobility: walks  Patient Pre-hospital Diet Restrictions:   Substance Use History:   Social History     Substance and Sexual Activity   Alcohol Use Not Currently    Comment: 1 a month     Social History     Tobacco Use   Smoking Status Former    Types: Cigarettes   Smokeless Tobacco Never   Tobacco Comments    Pt reports she quit smoking on 2/7/2024     Social History     Substance and Sexual Activity   Drug Use Never       Family History:  History reviewed. No pertinent family history.    Physical Exam:     Vitals:   Blood Pressure: 155/71 (03/05/24 0500)  Pulse: 85 (03/05/24 0500)  Temperature: 98 °F (36.7 °C) (03/05/24 0233)  Temp Source:  Oral (03/05/24 0233)  Respirations: 20 (03/05/24 0500)  Weight - Scale: 112 kg (246 lb 11.1 oz) (03/05/24 0233)  SpO2: 95 % (03/05/24 0500)    Physical Exam  Vitals and nursing note reviewed.   Constitutional:       General: She is not in acute distress.     Appearance: She is well-developed.   HENT:      Head: Normocephalic and atraumatic.   Eyes:      Conjunctiva/sclera: Conjunctivae normal.   Cardiovascular:      Rate and Rhythm: Normal rate and regular rhythm.      Heart sounds: No murmur heard.  Pulmonary:      Effort: Pulmonary effort is normal. No respiratory distress.      Breath sounds: Normal breath sounds.   Abdominal:      Palpations: Abdomen is soft.      Tenderness: There is no abdominal tenderness.   Musculoskeletal:         General: No swelling.      Cervical back: Neck supple.      Right lower leg: Edema present.      Left lower leg: Edema present.   Skin:     General: Skin is warm and dry.      Capillary Refill: Capillary refill takes 2 to 3 seconds.   Neurological:      Mental Status: She is alert and oriented to person, place, and time.   Psychiatric:         Mood and Affect: Mood normal.          Additional Data:     Lab Results:  Results from last 7 days   Lab Units 03/05/24  0302   WBC Thousand/uL 6.01   HEMOGLOBIN g/dL 10.3*   HEMATOCRIT % 30.8*   PLATELETS Thousands/uL 215   NEUTROS PCT % 59   LYMPHS PCT % 27   MONOS PCT % 9   EOS PCT % 4     Results from last 7 days   Lab Units 03/05/24  0302   SODIUM mmol/L 136   POTASSIUM mmol/L 2.7*   CHLORIDE mmol/L 99   CO2 mmol/L 29   BUN mg/dL 14   CREATININE mg/dL 0.95   ANION GAP mmol/L 8   CALCIUM mg/dL 9.2   ALBUMIN g/dL 4.1   TOTAL BILIRUBIN mg/dL 0.50   ALK PHOS U/L 56   ALT U/L 10   AST U/L 17   GLUCOSE RANDOM mg/dL 134                       Lines/Drains:  Invasive Devices       Peripheral Intravenous Line  Duration             Peripheral IV 03/05/24 Right Wrist <1 day                        Imaging: Reviewed radiology reports from this  admission including: chest xray and Personally reviewed the following imaging: chest xray  XR chest 1 view portable   ED Interpretation by Brennen Sim MD (03/05 0321)   No evidence of acute cardiopulmonary abnormality by my independent interpretation             EKG and Other Studies Reviewed on Admission:   EKG: No EKG obtained.    ** Please Note: This note has been constructed using a voice recognition system. **

## 2024-03-05 NOTE — ED NOTES
Pt ambulated to bathroom with crutches with minimal assist and semi-steady gait.     Stewart Junior RN  03/05/24 [id number]

## 2024-03-05 NOTE — PLAN OF CARE
Problem: Potential for Falls  Goal: Patient will remain free of falls  Description: INTERVENTIONS:  - Educate patient/family on patient safety including physical limitations  - Instruct patient to call for assistance with activity   - Consult OT/PT to assist with strengthening/mobility   - Keep Call bell within reach  - Keep bed low and locked with side rails adjusted as appropriate  - Keep care items and personal belongings within reach  - Initiate and maintain comfort rounds  - Apply yellow socks and bracelet for high fall risk patients  - Consider moving patient to room near nurses station  3/5/2024 1008 by Caprice Burk RN  Outcome: Progressing  3/5/2024 1007 by Caprice Burk RN  Outcome: Progressing     Problem: PAIN - ADULT  Goal: Verbalizes/displays adequate comfort level or baseline comfort level  Description: Interventions:  - Encourage patient to monitor pain and request assistance  - Assess pain using appropriate pain scale  - Administer analgesics based on type and severity of pain and evaluate response  - Implement non-pharmacological measures as appropriate and evaluate response  - Consider cultural and social influences on pain and pain management  - Notify physician/advanced practitioner if interventions unsuccessful or patient reports new pain  3/5/2024 1008 by Caprice Burk RN  Outcome: Progressing  3/5/2024 1007 by Caprice Burk RN  Outcome: Progressing

## 2024-03-06 LAB
ERYTHROCYTE [DISTWIDTH] IN BLOOD BY AUTOMATED COUNT: 13.8 % (ref 11.6–15.1)
HCT VFR BLD AUTO: 29 % (ref 34.8–46.1)
HGB BLD-MCNC: 9.5 G/DL (ref 11.5–15.4)
MAGNESIUM SERPL-MCNC: 1.9 MG/DL (ref 1.9–2.7)
MCH RBC QN AUTO: 29.3 PG (ref 26.8–34.3)
MCHC RBC AUTO-ENTMCNC: 32.8 G/DL (ref 31.4–37.4)
MCV RBC AUTO: 90 FL (ref 82–98)
PLATELET # BLD AUTO: 189 THOUSANDS/UL (ref 149–390)
PMV BLD AUTO: 10.5 FL (ref 8.9–12.7)
RBC # BLD AUTO: 3.24 MILLION/UL (ref 3.81–5.12)
WBC # BLD AUTO: 5.15 THOUSAND/UL (ref 4.31–10.16)

## 2024-03-06 PROCEDURE — 99232 SBSQ HOSP IP/OBS MODERATE 35: CPT | Performed by: HOSPITALIST

## 2024-03-06 PROCEDURE — 85027 COMPLETE CBC AUTOMATED: CPT

## 2024-03-06 PROCEDURE — 99204 OFFICE O/P NEW MOD 45 MIN: CPT

## 2024-03-06 PROCEDURE — 83735 ASSAY OF MAGNESIUM: CPT

## 2024-03-06 RX ORDER — NYSTATIN 100000 [USP'U]/G
POWDER TOPICAL 2 TIMES DAILY
Status: DISCONTINUED | OUTPATIENT
Start: 2024-03-06 | End: 2024-03-07 | Stop reason: HOSPADM

## 2024-03-06 RX ADMIN — ROPINIROLE HYDROCHLORIDE 1 MG: 1 TABLET, FILM COATED ORAL at 16:45

## 2024-03-06 RX ADMIN — UMECLIDINIUM BROMIDE AND VILANTEROL TRIFENATATE 1 PUFF: 62.5; 25 POWDER RESPIRATORY (INHALATION) at 08:42

## 2024-03-06 RX ADMIN — BUPROPION HYDROCHLORIDE 150 MG: 150 TABLET, EXTENDED RELEASE ORAL at 08:33

## 2024-03-06 RX ADMIN — PANTOPRAZOLE SODIUM 20 MG: 20 TABLET, DELAYED RELEASE ORAL at 16:46

## 2024-03-06 RX ADMIN — PANTOPRAZOLE SODIUM 20 MG: 20 TABLET, DELAYED RELEASE ORAL at 08:33

## 2024-03-06 RX ADMIN — APIXABAN 5 MG: 5 TABLET, FILM COATED ORAL at 08:33

## 2024-03-06 RX ADMIN — HYDROCHLOROTHIAZIDE 12.5 MG: 12.5 TABLET ORAL at 08:41

## 2024-03-06 RX ADMIN — DICLOFENAC SODIUM TOPICAL GEL, 1% 2 G: 10 GEL TOPICAL at 15:27

## 2024-03-06 RX ADMIN — APIXABAN 5 MG: 5 TABLET, FILM COATED ORAL at 17:02

## 2024-03-06 RX ADMIN — ROPINIROLE 2 MG: 1 TABLET, FILM COATED ORAL at 22:11

## 2024-03-06 RX ADMIN — TRAMADOL HYDROCHLORIDE 50 MG: 50 TABLET, COATED ORAL at 13:26

## 2024-03-06 RX ADMIN — NYSTATIN: 100000 POWDER TOPICAL at 18:36

## 2024-03-06 RX ADMIN — DICLOFENAC SODIUM TOPICAL GEL, 1% 2 G: 10 GEL TOPICAL at 22:30

## 2024-03-06 RX ADMIN — FUROSEMIDE 20 MG: 20 TABLET ORAL at 08:41

## 2024-03-06 NOTE — PLAN OF CARE
Problem: Potential for Falls  Goal: Patient will remain free of falls  Description: INTERVENTIONS:  - Educate patient/family on patient safety including physical limitations  - Instruct patient to call for assistance with activity   - Consult OT/PT to assist with strengthening/mobility   - Keep Call bell within reach  - Keep bed low and locked with side rails adjusted as appropriate  - Keep care items and personal belongings within reach  - Initiate and maintain comfort rounds  - Apply yellow socks and bracelet for high fall risk patients  - Consider moving patient to room near nurses station  Outcome: Progressing     Problem: PAIN - ADULT  Goal: Verbalizes/displays adequate comfort level or baseline comfort level  Description: Interventions:  - Encourage patient to monitor pain and request assistance  - Assess pain using appropriate pain scale  - Administer analgesics based on type and severity of pain and evaluate response  - Implement non-pharmacological measures as appropriate and evaluate response  - Consider cultural and social influences on pain and pain management  - Notify physician/advanced practitioner if interventions unsuccessful or patient reports new pain  Outcome: Progressing     Problem: DISCHARGE PLANNING  Goal: Discharge to home or other facility with appropriate resources  Description: INTERVENTIONS:  - Identify barriers to discharge w/patient and caregiver  - Arrange for needed discharge resources and transportation as appropriate  - Identify discharge learning needs (meds, wound care, etc.)  - Arrange for interpretive services to assist at discharge as needed  - Refer to Case Management Department for coordinating discharge planning if the patient needs post-hospital services based on physician/advanced practitioner order or complex needs related to functional status, cognitive ability, or social support system  Outcome: Progressing     Problem: METABOLIC, FLUID AND ELECTROLYTES -  ADULT  Goal: Fluid balance maintained  Description: INTERVENTIONS:  - Monitor labs   - Monitor I/O and WT  - Instruct patient on fluid and nutrition as appropriate  - Assess for signs & symptoms of volume excess or deficit  Outcome: Progressing     Problem: SKIN/TISSUE INTEGRITY - ADULT  Goal: Skin Integrity remains intact(Skin Breakdown Prevention)  Description: Assess:  -Perform Tam assessment every   -Clean and moisturize skin every   -Inspect skin when repositioning, toileting, and assisting with ADLS  -Assess under medical devices such as  every   -Assess extremities for adequate circulation and sensation     Bed Management:  -Have minimal linens on bed & keep smooth, unwrinkled  -Change linens as needed when moist or perspiring  -Avoid sitting or lying in one position for more than  hours while in bed  -Keep HOB at degrees     Toileting:  -Offer bedside commode  -Assess for incontinence every   -Use incontinent care products after each incontinent episode such as     Activity:  -Mobilize patient  times a day  -Encourage activity and walks on unit  -Encourage or provide ROM exercises   -Turn and reposition patient every  Hours  -Use appropriate equipment to lift or move patient in bed  -Instruct/ Assist with weight shifting every  when out of bed in chair  -Consider limitation of chair time  hour intervals    Skin Care:  -Avoid use of baby powder, tape, friction and shearing, hot water or constrictive clothing  -Relieve pressure over bony prominences using   -Do not massage red bony areas    Next Steps:  -Teach patient strategies to minimize risks such as    -Consider consults to  interdisciplinary teams such as   Outcome: Progressing     Problem: MUSCULOSKELETAL - ADULT  Goal: Maintain or return mobility to safest level of function  Description: INTERVENTIONS:  - Assess patient's ability to carry out ADLs; assess patient's baseline for ADL function and identify physical deficits which impact ability to  perform ADLs (bathing, care of mouth/teeth, toileting, grooming, dressing, etc.)  - Assess/evaluate cause of self-care deficits   - Assess range of motion  - Assess patient's mobility  - Assess patient's need for assistive devices and provide as appropriate  - Encourage maximum independence but intervene and supervise when necessary  - Involve family in performance of ADLs  - Assess for home care needs following discharge   - Consider OT consult to assist with ADL evaluation and planning for discharge  - Provide patient education as appropriate  Outcome: Progressing

## 2024-03-06 NOTE — PROGRESS NOTES
formerly [facility]  Progress Note  Name: Jeni Gutierrez I  MRN: [id number]  Unit/Bed#: [place] [address] -01 I Date of Admission: 3/5/2024   Date of Service: 3/6/2024 I Hospital Day: 0    Assessment/Plan   * Chronic venous insufficiency  Assessment & Plan  Worsening LE edema, no dyspnea on exertion/orthopnea. Less likely CHF  CXR reviewed: no acute cardiopulmonary disease   TTE 2023 reviewed: LVEF 55-60%, grade I diastolic dysfunction    Continue lasix    Has bilateral baker's cysts.    Ortho to eval for drainage.    Homelessness  Assessment & Plan  Case management consulted               Subjective:   Still complaining of pain in area of Baker's cysts bilateral  She is asking to have them drained.        Objective:     Vitals:   Temp (24hrs), Av.1 °F (36.7 °C), Min:97.7 °F (36.5 °C), Max:98.3 °F (36.8 °C)    Temp:  [97.7 °F (36.5 °C)-98.3 °F (36.8 °C)] 97.7 °F (36.5 °C)  HR:  [81-89] 81  Resp:  [18-20] 18  BP: (108-115)/(49-63) 111/52  SpO2:  [94 %-97 %] 94 %  Body mass index is 44.07 kg/m².     Input and Output Summary (last 24 hours):       Intake/Output Summary (Last 24 hours) at 3/6/2024 1137  Last data filed at 3/6/2024 1102  Gross per 24 hour   Intake 720 ml   Output 4850 ml   Net -4130 ml       Physical Exam:     Physical Exam  Vitals and nursing note reviewed.   HENT:      Head: Normocephalic and atraumatic.   Eyes:      Pupils: Pupils are equal, round, and reactive to light.   Cardiovascular:      Rate and Rhythm: Normal rate and regular rhythm.      Heart sounds: No murmur heard.     No friction rub. No gallop.   Pulmonary:      Effort: Pulmonary effort is normal.      Breath sounds: Normal breath sounds. No wheezing or rales.   Abdominal:      General: Bowel sounds are normal.      Palpations: Abdomen is soft.      Tenderness: There is no abdominal tenderness.   Musculoskeletal:      Right lower leg: Edema present.      Left lower leg: Edema present.      Comments: Bilateral  baker's cysts.          .       Additional Data:     Labs:    Results from last 7 days   Lab Units 03/06/24  0456 03/05/24  0302   WBC Thousand/uL 5.15 6.01   HEMOGLOBIN g/dL 9.5* 10.3*   HEMATOCRIT % 29.0* 30.8*   PLATELETS Thousands/uL 189 215   NEUTROS PCT %  --  59   LYMPHS PCT %  --  27   MONOS PCT %  --  9   EOS PCT %  --  4     Results from last 7 days   Lab Units 03/05/24  0647 03/05/24  0302   POTASSIUM mmol/L 3.1* 2.7*   CHLORIDE mmol/L 100 99   CO2 mmol/L 31 29   BUN mg/dL 14 14   CREATININE mg/dL 0.94 0.95   CALCIUM mg/dL 9.0 9.2   ALK PHOS U/L  --  56   ALT U/L  --  10   AST U/L  --  17                       * I Have Reviewed All Lab Data     Recent Cultures (last 7 days):             Last 24 Hours Medication List:   Current Facility-Administered Medications   Medication Dose Route Frequency Provider Last Rate    acetaminophen  650 mg Oral Q6H PRN Jer Bravo PA-C      aluminum-magnesium hydroxide-simethicone  30 mL Oral Q6H PRN Jer Bravo PA-C      apixaban  5 mg Oral BID Jer Bravo PA-C      buPROPion  150 mg Oral Daily Jer Bravo PA-C      furosemide  20 mg Oral Daily Jer Bravo PA-C      hydroCHLOROthiazide  12.5 mg Oral Daily Jer Bravo PA-C      ondansetron  4 mg Intravenous Q6H PRN Jer Bravo PA-C      pantoprazole  20 mg Oral BID With Meals Jer Bravo PA-C      polyethylene glycol  17 g Oral Daily PRN Jer Bravo PA-C      rOPINIRole  1 mg Oral Daily With Dinner Kyara Holley PA-C      rOPINIRole  2 mg Oral HS Kyara Holley PA-C      traMADol  50 mg Oral Q6H PRN Dakota Prieto DO      umeclidinium-vilanterol  1 puff Inhalation Daily Jer Bravo PA-C           VTE Pharmacologic Prophylaxis:   Pharmacologic: Apixaban (Eliquis)      Current Length of Stay: 0 day(s)    Current Patient Status: Observation       Discharge Plan: ortho eval.  Case mgt eval.    Code Status: Level 1 - Full Code           Today, Patient Was Seen By: Dakota Prieto,     ** Please Note:  Dictation voice to text software may have been used in the creation of this document. **

## 2024-03-06 NOTE — MALNUTRITION/BMI
This medical record reflects one or more clinical indicators suggestive of malnutrition and/or morbid obesity.                                 BMI Findings:  Adult BMI Classifications: Morbid Obesity 40-44.9        Body mass index is 44.07 kg/m².     See Nutrition note dated 3/6/24 for additional details.  Completed nutrition assessment is viewable in the nutrition documentation.

## 2024-03-06 NOTE — PLAN OF CARE
Problem: Potential for Falls  Goal: Patient will remain free of falls  Description: INTERVENTIONS:  - Educate patient/family on patient safety including physical limitations  - Instruct patient to call for assistance with activity   - Consult OT/PT to assist with strengthening/mobility   - Keep Call bell within reach  - Keep bed low and locked with side rails adjusted as appropriate  - Keep care items and personal belongings within reach  - Initiate and maintain comfort rounds  - Apply yellow socks and bracelet for high fall risk patients  - Consider moving patient to room near nurses station  Outcome: Progressing     Problem: PAIN - ADULT  Goal: Verbalizes/displays adequate comfort level or baseline comfort level  Description: Interventions:  - Encourage patient to monitor pain and request assistance  - Assess pain using appropriate pain scale  - Administer analgesics based on type and severity of pain and evaluate response  - Implement non-pharmacological measures as appropriate and evaluate response  - Consider cultural and social influences on pain and pain management  - Notify physician/advanced practitioner if interventions unsuccessful or patient reports new pain  Outcome: Progressing     Problem: DISCHARGE PLANNING  Goal: Discharge to home or other facility with appropriate resources  Description: INTERVENTIONS:  - Identify barriers to discharge w/patient and caregiver  - Arrange for needed discharge resources and transportation as appropriate  - Identify discharge learning needs (meds, wound care, etc.)  - Arrange for interpretive services to assist at discharge as needed  - Refer to Case Management Department for coordinating discharge planning if the patient needs post-hospital services based on physician/advanced practitioner order or complex needs related to functional status, cognitive ability, or social support system  Outcome: Progressing     Problem: METABOLIC, FLUID AND ELECTROLYTES -  ADULT  Goal: Fluid balance maintained  Description: INTERVENTIONS:  - Monitor labs   - Monitor I/O and WT  - Instruct patient on fluid and nutrition as appropriate  - Assess for signs & symptoms of volume excess or deficit  Outcome: Progressing     Problem: SKIN/TISSUE INTEGRITY - ADULT  Goal: Skin Integrity remains intact(Skin Breakdown Prevention)  Description: Assess:  -Inspect skin when repositioning, toileting, and assisting with ADLS  -Assess extremities for adequate circulation and sensation     Bed Management:  -Have minimal linens on bed & keep smooth, unwrinkled  -Change linens as needed when moist or perspiring  -Avoid sitting or lying in one position for more than 2 hours while in bed  -Keep HOB at 30 degrees     Toileting:  -Offer bedside commode    Activity:  -Mobilize patient 3 times a day  -Encourage activity and walks on unit  -Encourage or provide ROM exercises   -Turn and reposition patient every 2 Hours  -Use appropriate equipment to lift or move patient in bed    Skin Care:  -Avoid use of baby powder, tape, friction and shearing, hot water or constrictive clothing  -Do not massage red bony areas    Next Steps:  Outcome: Progressing     Problem: MUSCULOSKELETAL - ADULT  Goal: Maintain or return mobility to safest level of function  Description: INTERVENTIONS:  - Assess patient's ability to carry out ADLs; assess patient's baseline for ADL function and identify physical deficits which impact ability to perform ADLs (bathing, care of mouth/teeth, toileting, grooming, dressing, etc.)  - Assess/evaluate cause of self-care deficits   - Assess range of motion  - Assess patient's mobility  - Assess patient's need for assistive devices and provide as appropriate  - Encourage maximum independence but intervene and supervise when necessary  - Involve family in performance of ADLs  - Assess for home care needs following discharge   - Consider OT consult to assist with ADL evaluation and planning for  discharge  - Provide patient education as appropriate  Outcome: Progressing

## 2024-03-06 NOTE — RESTORATIVE TECHNICIAN NOTE
Restorative Technician Note      Patient Name: Jeni Gutierrez     Restorative Tech Visit Date: 03/06/24  Note Type: Mobility  Patient Position Upon Consult: Supine  Mobility / Activity Provided: assisted pt in ambulating in hallway; pt stated she has been ambulating independently;  Activity Performed: Ambulated  Assistive Device: Roller walker  Patient Position at End of Consult: Supine; All needs within reach

## 2024-03-06 NOTE — ASSESSMENT & PLAN NOTE
Worsening LE edema, no dyspnea on exertion/orthopnea. Less likely CHF  CXR reviewed: no acute cardiopulmonary disease   TTE 03/19/2023 reviewed: LVEF 55-60%, grade I diastolic dysfunction    Continue lasix    Has bilateral baker's cysts.    Ortho to eval for drainage.

## 2024-03-06 NOTE — CONSULTS
Orthopedics   Jeni Gutierrez 73 y.o. female MRN: [id number]  Unit/Bed#: [place] [address] -01      Chief Complaint:   bilateral knee pain - left worse than right    HPI:   73 y.o. female complaining of bilateral knee pain, left worse than right.  The patient states this pain has been persistent for many years.  She meant to discuss knee replacement surgery long before now, but several life events prevented her from pursuing this including eviction from her home.  She is currently hospitalized for bilateral leg swelling which was preventing her from walking properly and wearing shoes.  She states someone had previously drained her Baker's cysts, and she wants to know if this can be done for her while she is hospitalized.  No open wounds noted.  No other complaints at this time. PMH significant for asthma, COPD, HTN, Afibb on Eliquis.   She has upcoming appointments with [facility] to discuss potential knee replacement surgery.    Review Of Systems:   Skin: Normal  Neuro: See HPI  Musculoskeletal: See HPI  14 point review of systems negative except as stated above     Past Medical History:   Past Medical History:   Diagnosis Date    Asthma     COPD (chronic obstructive pulmonary disease) (HCC)     Hypertension     Palpitations        Past Surgical History:   Past Surgical History:   Procedure Laterality Date    BACK SURGERY      CHOLECYSTECTOMY      HYSTERECTOMY         Family History:  Family history reviewed and non-contributory  History reviewed. No pertinent family history.    Social History:  Social History     Socioeconomic History    Marital status: Single     Spouse name: None    Number of children: None    Years of education: None    Highest education level: None   Occupational History    None   Tobacco Use    Smoking status: Former     Types: Cigarettes    Smokeless tobacco: Never    Tobacco comments:     Pt reports she quit smoking on 2/7/2024   Vaping Use    Vaping status: Never Used   Substance and Sexual Activity     Alcohol use: Not Currently     Comment: 1 a month    Drug use: Never    Sexual activity: Not Currently   Other Topics Concern    None   Social History Narrative    None     Social Determinants of Health     Financial Resource Strain: High Risk (12/29/2023)    Received from [facility]    Overall Financial Resource Strain (CARDIA)     Difficulty of Paying Living Expenses: Very hard   Food Insecurity: Food Insecurity Present (3/5/2024)    Hunger Vital Sign     Worried About Running Out of Food in the Last Year: Sometimes true     Ran Out of Food in the Last Year: Sometimes true   Transportation Needs: Unmet Transportation Needs (3/5/2024)    PRAPARE - Transportation     Lack of Transportation (Medical): Yes     Lack of Transportation (Non-Medical): No   Physical Activity: Inactive (10/30/2023)    Received from [facility]    Exercise Vital Sign     Days of Exercise per Week: 0 days     Minutes of Exercise per Session: 0 min   Stress: Stress Concern Present (10/30/2023)    Received from [facility]    [place] [employer] - Occupational Stress Questionnaire     Feeling of Stress : To some extent   Social Connections: Socially Isolated (10/30/2023)    Received from [facility]    Social Connection and Isolation Panel [NHANES]     Frequency of Communication with Friends and Family: More than three times a week     Frequency of Social Gatherings with Friends and Family: Never     Attends [employer] Services: Never     Active Member of Clubs or Organizations: No     Attends Club or Organization Meetings: Never     Marital Status:    Intimate Partner Violence: Not At Risk (12/29/2023)    Received from [facility]    Humiliation, Afraid, Rape, and Kick questionnaire     Fear of Current or Ex-Partner: No     Emotionally Abused: No     Physically Abused: No     Sexually Abused: No   Housing Stability: High Risk  (3/5/2024)    Housing Stability Vital Sign     Unable to Pay for Housing in the Last Year: Yes     Number of Places Lived in the Last Year: Not on file     Unstable Housing in the Last Year: Yes       Allergies:   Allergies   Allergen Reactions    Clarithromycin Diarrhea    Nabumetone Other (See Comments)     rash/PT TAKES MELOXICAM**    Hydroxychloroquine Rash    Iodinated Contrast Media Rash           Labs:  0   Lab Value Date/Time    HCT 29.0 (L) 03/06/2024 0456    HCT 30.8 (L) 03/05/2024 0302    HCT 29.4 (L) 02/10/2024 2019    HGB 9.5 (L) 03/06/2024 0456    HGB 10.3 (L) 03/05/2024 0302    HGB 9.9 (L) 02/10/2024 2019    WBC 5.15 03/06/2024 0456    WBC 6.01 03/05/2024 0302    WBC 4.96 02/10/2024 2019       Meds:    Current Facility-Administered Medications:     acetaminophen (TYLENOL) tablet 650 mg, 650 mg, Oral, Q6H PRN, Jer Bravo PA-C    aluminum-magnesium hydroxide-simethicone (MAALOX) oral suspension 30 mL, 30 mL, Oral, Q6H PRN, Jer Bravo PA-C    apixaban (ELIQUIS) tablet 5 mg, 5 mg, Oral, BID, Jer Bravo PA-C, 5 mg at 03/06/24 0833    buPROPion (WELLBUTRIN XL) 24 hr tablet 150 mg, 150 mg, Oral, Daily, Jer Bravo PA-C, 150 mg at 03/06/24 0833    Diclofenac Sodium (VOLTAREN) 1 % topical gel 2 g, 2 g, Topical, 4x Daily PRN, Anastacia Aguila PA-C    furosemide (LASIX) tablet 20 mg, 20 mg, Oral, Daily, Jer Bravo PA-C, 20 mg at 03/06/24 0841    hydroCHLOROthiazide tablet 12.5 mg, 12.5 mg, Oral, Daily, Jer Bravo PA-C, 12.5 mg at 03/06/24 0841    ondansetron (ZOFRAN) injection 4 mg, 4 mg, Intravenous, Q6H PRN, Jer Bravo PA-C    pantoprazole (PROTONIX) EC tablet 20 mg, 20 mg, Oral, BID With Meals, Jer Bravo PA-C, 20 mg at 03/06/24 0833    polyethylene glycol (MIRALAX) packet 17 g, 17 g, Oral, Daily PRN, Jer Bravo PA-C    rOPINIRole (REQUIP) tablet 1 mg, 1 mg, Oral, Daily With Dinner, Kyara Holley PA-C    rOPINIRole (REQUIP) tablet 2 mg, 2 mg, Oral, HS, Kyara Holley PA-C, 2 mg at  "03/05/24 2042    traMADol (ULTRAM) tablet 50 mg, 50 mg, Oral, Q6H PRN, Dakota LEMUS Prechtel, DO, 50 mg at 03/05/24 2331    umeclidinium-vilanterol 62.5-25 mcg/actuation inhaler 1 puff, 1 puff, Inhalation, Daily, Jer OZ Bravo, 1 puff at 03/06/24 0842    Blood Culture:   No results found for: \"BLOODCX\"    Wound Culture:   No results found for: \"WOUNDCULT\"    Ins and Outs:  I/O last 24 hours:  In: 1440 [P.O.:1440]  Out: 5150 [Urine:5150]          Physical Exam:   /52   Pulse 81   Temp 97.7 °F (36.5 °C)   Resp 18   Ht 5' 2\" (1.575 m)   Wt 109 kg (240 lb 15.4 oz)   SpO2 94%   BMI 44.07 kg/m²   Gen: No acute distress, resting comfortably in bed  HEENT: Eyes clear, moist mucus membranes, hearing intact  Respiratory: No audible wheezing or stridor  Cardiovascular: Well perfused peripherally, 2+ distal pulse  Abdomen: nondistended, no peritoneal signs  Musculoskeletal: Right lower extremity  Skin intact  Minimally tender to palpation over posterior knee  No palpable effusion within the knee joint  Sensation intact over the toes  5/5 strength to ankle dorsi/plantar flexion, EHL/FHL  Toes WWP  Musculature is soft and compressible  Leg lengths equal  Left lower extremity  Skin intact  Tender to palpation over posterior knee  No palpable effusion within the knee joint  AROM to 10 degrees extension and 90 degrees flexion   Can perform straight leg raise  Sensation intact over the toes  5/5 strength to ankle dorsi/plantar flexion, EHL/FHL  Toes WWP  Musculature is soft and compressible  Leg lengths equal    Assessment:  73 y.o. female with bilateral knee Baker's cysts, left worse than right.    Plan:   The case has been discussed with my attending physician.  At this time, we do not recommend draining the Baker's cysts.  Typically, this should be done under ultrasound guidance due to the vital vascular and nervous structures which run through the posterior aspect of the knee.  Also, these cysts have a tendency to " return upon drainage.  No acute orthopedic surgical intervention is necessary at this time.  Compressive Ace wraps over the knees PRN to control swelling.  Voltaren gel has been ordered PRN for posterior leg pain.    Ice may be utilized for pain if her vascular status allows for it.  She has an upcoming appointment with [facility] to discuss potential knee replacement surgery.  We would recommend keeping this appointment to discuss definitive treatment of her knee pain.  Weight bearing as tolerated bilateral lower extremities  PT/OT  Pain control  Body mass index is 44.07 kg/m². morbidly obese. Recommend behavior modifications, nutrition, and physical activity.  Dispo: Ortho signing off    Anastacia Aguila PA-C

## 2024-03-07 VITALS
DIASTOLIC BLOOD PRESSURE: 74 MMHG | HEIGHT: 62 IN | WEIGHT: 241.84 LBS | RESPIRATION RATE: 20 BRPM | HEART RATE: 89 BPM | SYSTOLIC BLOOD PRESSURE: 155 MMHG | OXYGEN SATURATION: 96 % | TEMPERATURE: 96.7 F | BODY MASS INDEX: 44.5 KG/M2

## 2024-03-07 PROCEDURE — 99239 HOSP IP/OBS DSCHRG MGMT >30: CPT | Performed by: HOSPITALIST

## 2024-03-07 RX ORDER — BUPROPION HYDROCHLORIDE 150 MG/1
150 TABLET ORAL DAILY
Qty: 60 TABLET | Refills: 0 | Status: SHIPPED | OUTPATIENT
Start: 2024-03-08

## 2024-03-07 RX ORDER — FUROSEMIDE 20 MG/1
20 TABLET ORAL DAILY
Qty: 30 TABLET | Refills: 0 | Status: SHIPPED | OUTPATIENT
Start: 2024-03-07

## 2024-03-07 RX ADMIN — APIXABAN 5 MG: 5 TABLET, FILM COATED ORAL at 08:43

## 2024-03-07 RX ADMIN — PANTOPRAZOLE SODIUM 20 MG: 20 TABLET, DELAYED RELEASE ORAL at 08:44

## 2024-03-07 RX ADMIN — UMECLIDINIUM BROMIDE AND VILANTEROL TRIFENATATE 1 PUFF: 62.5; 25 POWDER RESPIRATORY (INHALATION) at 08:45

## 2024-03-07 RX ADMIN — NYSTATIN: 100000 POWDER TOPICAL at 08:45

## 2024-03-07 RX ADMIN — HYDROCHLOROTHIAZIDE 12.5 MG: 12.5 TABLET ORAL at 08:43

## 2024-03-07 RX ADMIN — FUROSEMIDE 20 MG: 20 TABLET ORAL at 08:44

## 2024-03-07 RX ADMIN — BUPROPION HYDROCHLORIDE 150 MG: 150 TABLET, EXTENDED RELEASE ORAL at 08:44

## 2024-03-07 NOTE — NURSING NOTE
Patient was d/c'd to home in no distress. No c/o pain, able to make needs known. Instructions were reviewed with the patient and made aware that prescriptions were sent to pharmacy of choice. Patient was transferred to the front entrance via wheelchair. All belongings left with the patient.

## 2024-03-07 NOTE — DISCHARGE SUMMARY
Assessment/Plan   * Chronic venous insufficiency  Assessment & Plan  Patient presents with chronic bilateral lower extremity edema.     She had run out of her Lasix that she was taking for lower extremity edema     [place] to be in CHF.     Did restart her Lasix.  We also discussed that she is drinking way too much water and that is not benefiting her edema.     She was complaining of bilateral Baker's cyst and asked that they be drained.  Orthopedics saw her and did not feel she would benefit from drainage and it would be very risky.     Patient is going to see orthopedics as an outpatient for possible knee replacement in the next 2 weeks.     Patient is stable to go home.  I gave her prescriptions for Lasix that she had run out of.  We discussed she should be on a 1200 mL/day fluid restriction to try and keep the swelling under control.              Discharging Physician / Practitioner: Dakota Prieto DO  PCP: Maryan Yo MD  Admission Date:   Admission Orders (From admission, onward)          Ordered         03/05/24 0415   Place in Observation  Once                               Discharge Date: 03/07/24     Medical Problems         Resolved Problems  Date Reviewed: 3/5/2024   None               Consultations During Hospital Stay:  Orthopedics              Reason for Admission: Bilateral leg swelling        Hospital Course:      Jeni Gutierrez is a 73 y.o. female patient who originally presented to the hospital on 3/5/2024 due to bilateral leg swelling.  This is chronic for the patient.  She has chronic lower extremity edema.  She takes Lasix daily for this.  However she ran out of the Lasix.  She is also currently homeless and living in her car and she feels very cramped and there likely contributing to the lower extremity edema.  She was admitted placed back on Lasix.  She is drinking an excessive amount of water because she thought it was good for you to drink a lot of water.  But I did explain to  "her that this is counterproductive but were trying to get edema off with Lasix.  So I told her about a 1200 mL/day fluid restriction.  She is get a follow-up lab.  Gave her new prescriptions for Lasix.  She wanted her Baker's cyst drained, orthopedics saw her and felt it was too risky and would not be beneficial.  She unfortunately is still homeless and living in her car.  She is already on list for housing.  Is been about 4 months that she has been on the list and it could take up to a year to get housing.  Social work provided her with community services information.  Patient is okay to go home     Please see above list of diagnoses and related plan for additional information.         Condition at Discharge: stable         Discharge Day Visit / Exam:      Subjective:  feels about the same  She understands that she needs to drink less water.           Vitals: Blood Pressure: 155/74 (03/07/24 0816)  Pulse: 89 (03/07/24 0816)  Temperature: (!) 96.7 °F (35.9 °C) (03/07/24 0816)  Temp Source: Temporal (03/07/24 0816)  Respirations: 20 (03/07/24 0816)  Height: 5' 2\" (157.5 cm) (03/05/24 0642)  Weight - Scale: 110 kg (241 lb 13.5 oz) (03/07/24 0541)  SpO2: 96 % (03/07/24 0816)     Exam:      Physical Exam  Vitals and nursing note reviewed.   HENT:      Head: Normocephalic and atraumatic.   Eyes:      Pupils: Pupils are equal, round, and reactive to light.   Cardiovascular:      Rate and Rhythm: Normal rate and regular rhythm.      Heart sounds: No murmur heard.     No friction rub. No gallop.   Pulmonary:      Effort: Pulmonary effort is normal.      Breath sounds: Normal breath sounds. No wheezing or rales.   Abdominal:      General: Bowel sounds are normal.      Palpations: Abdomen is soft.      Tenderness: There is no abdominal tenderness.   Musculoskeletal:      Right lower leg: Edema (1+) present.      Left lower leg: Edema (1+) present.         .           Discharge instructions/Information to patient and family: "   See after visit summary for information provided to patient and family.       Provisions for Follow-Up Care:  See after visit summary for information related to follow-up care and any pertinent home health orders.       Disposition:      Home        Discharge Statement:  I spent 36 minutes discharging the patient. This time was spent on the day of discharge. I had direct contact with the patient on the day of discharge. Greater than 50% of the total time was spent examining patient, answering all patient questions, arranging and discussing plan of care with patient as well as directly providing post-discharge instructions.  Additional time then spent on discharge activities.     Discharge Medications:  See after visit summary for reconciled discharge medications provided to patient and family.       ** Please Note: This note has been constructed using a voice recognition system **

## 2024-03-07 NOTE — RESTORATIVE TECHNICIAN NOTE
Restorative Technician Note      Patient Name: Jeni Gutierrez     Restorative Tech Visit Date: 03/07/24  Note Type: Mobility  Patient Position Upon Consult: Supine  Mobility / Activity Provided: pt refused ambulation due to leg pain; assisted pt in repositioning legs for comfort  Activity Performed: Repositioned  Patient Position at End of Consult: Supine; All needs within reach; Bed/Chair alarm activated

## 2024-03-07 NOTE — PLAN OF CARE
Problem: Potential for Falls  Goal: Patient will remain free of falls  Description: INTERVENTIONS:  - Educate patient/family on patient safety including physical limitations  - Instruct patient to call for assistance with activity   - Consult OT/PT to assist with strengthening/mobility   - Keep Call bell within reach  - Keep bed low and locked with side rails adjusted as appropriate  - Keep care items and personal belongings within reach  - Initiate and maintain comfort rounds  - Apply yellow socks and bracelet for high fall risk patients  - Consider moving patient to room near nurses station  Outcome: Progressing     Problem: PAIN - ADULT  Goal: Verbalizes/displays adequate comfort level or baseline comfort level  Description: Interventions:  - Encourage patient to monitor pain and request assistance  - Assess pain using appropriate pain scale  - Administer analgesics based on type and severity of pain and evaluate response  - Implement non-pharmacological measures as appropriate and evaluate response  - Consider cultural and social influences on pain and pain management  - Notify physician/advanced practitioner if interventions unsuccessful or patient reports new pain  Outcome: Progressing     Problem: DISCHARGE PLANNING  Goal: Discharge to home or other facility with appropriate resources  Description: INTERVENTIONS:  - Identify barriers to discharge w/patient and caregiver  - Arrange for needed discharge resources and transportation as appropriate  - Identify discharge learning needs (meds, wound care, etc.)  - Arrange for interpretive services to assist at discharge as needed  - Refer to Case Management Department for coordinating discharge planning if the patient needs post-hospital services based on physician/advanced practitioner order or complex needs related to functional status, cognitive ability, or social support system  Outcome: Progressing     Problem: METABOLIC, FLUID AND ELECTROLYTES -  ADULT  Goal: Fluid balance maintained  Description: INTERVENTIONS:  - Monitor labs   - Monitor I/O and WT  - Instruct patient on fluid and nutrition as appropriate  - Assess for signs & symptoms of volume excess or deficit  Outcome: Progressing     Problem: SKIN/TISSUE INTEGRITY - ADULT  Goal: Skin Integrity remains intact(Skin Breakdown Prevention)  Description: Assess:  -Perform Tam assessment every shift  -Clean and moisturize skin every shift  -Inspect skin when repositioning, toileting, and assisting with ADLS  -Assess under medical devices such as masimo every shift  -Assess extremities for adequate circulation and sensation     Bed Management:  -Have minimal linens on bed & keep smooth, unwrinkled  -Change linens as needed when moist or perspiring  -Avoid sitting or lying in one position for more than 2 hours while in bed  -Keep HOB at 30degrees     Outcome: Progressing     Problem: MUSCULOSKELETAL - ADULT  Goal: Maintain or return mobility to safest level of function  Description: INTERVENTIONS:  - Assess patient's ability to carry out ADLs; assess patient's baseline for ADL function and identify physical deficits which impact ability to perform ADLs (bathing, care of mouth/teeth, toileting, grooming, dressing, etc.)  - Assess/evaluate cause of self-care deficits   - Assess range of motion  - Assess patient's mobility  - Assess patient's need for assistive devices and provide as appropriate  - Encourage maximum independence but intervene and supervise when necessary  - Involve family in performance of ADLs  - Assess for home care needs following discharge   - Consider OT consult to assist with ADL evaluation and planning for discharge  - Provide patient education as appropriate  Outcome: Progressing

## 2024-03-07 NOTE — ASSESSMENT & PLAN NOTE
Patient presents with chronic bilateral lower extremity edema.    She had run out of her Lasix that she was taking for lower extremity edema    [place] to be in CHF.    Did restart her Lasix.  We also discussed that she is drinking way too much water and that is not benefiting her edema.    She was complaining of bilateral Baker's cyst and asked that they be drained.  Orthopedics saw her and did not feel she would benefit from drainage and it would be very risky.    Patient is going to see orthopedics as an outpatient for possible knee replacement in the next 2 weeks.    Patient is stable to go home.  I gave her prescriptions for Lasix that she had run out of.  We discussed she should be on a 1200 mL/day fluid restriction to try and keep the swelling under control.

## 2024-03-07 NOTE — PLAN OF CARE
Problem: Potential for Falls  Goal: Patient will remain free of falls  Description: INTERVENTIONS:  - Educate patient/family on patient safety including physical limitations  - Instruct patient to call for assistance with activity   - Consult OT/PT to assist with strengthening/mobility   - Keep Call bell within reach  - Keep bed low and locked with side rails adjusted as appropriate  - Keep care items and personal belongings within reach  - Initiate and maintain comfort rounds  - Apply yellow socks and bracelet for high fall risk patients  - Consider moving patient to room near nurses station  3/7/2024 1439 by Raphael Quinteros RN  Outcome: Adequate for Discharge  3/7/2024 1058 by Raphael Quinteros RN  Outcome: Progressing     Problem: PAIN - ADULT  Goal: Verbalizes/displays adequate comfort level or baseline comfort level  Description: Interventions:  - Encourage patient to monitor pain and request assistance  - Assess pain using appropriate pain scale  - Administer analgesics based on type and severity of pain and evaluate response  - Implement non-pharmacological measures as appropriate and evaluate response  - Consider cultural and social influences on pain and pain management  - Notify physician/advanced practitioner if interventions unsuccessful or patient reports new pain  3/7/2024 1439 by Raphael Quinteros RN  Outcome: Adequate for Discharge  3/7/2024 1058 by Raphael Quinteros RN  Outcome: Progressing     Problem: DISCHARGE PLANNING  Goal: Discharge to home or other facility with appropriate resources  Description: INTERVENTIONS:  - Identify barriers to discharge w/patient and caregiver  - Arrange for needed discharge resources and transportation as appropriate  - Identify discharge learning needs (meds, wound care, etc.)  - Arrange for interpretive services to assist at discharge as needed  - Refer to Case Management Department for coordinating discharge planning if the patient needs post-hospital  services based on physician/advanced practitioner order or complex needs related to functional status, cognitive ability, or social support system  3/7/2024 1439 by Raphael Quinteros RN  Outcome: Adequate for Discharge  3/7/2024 1058 by Raphael Quinteros RN  Outcome: Progressing     Problem: METABOLIC, FLUID AND ELECTROLYTES - ADULT  Goal: Fluid balance maintained  Description: INTERVENTIONS:  - Monitor labs   - Monitor I/O and WT  - Instruct patient on fluid and nutrition as appropriate  - Assess for signs & symptoms of volume excess or deficit  3/7/2024 1439 by Raphael Quinteros RN  Outcome: Adequate for Discharge  3/7/2024 1058 by Raphael Quinteros RN  Outcome: Progressing     Problem: SKIN/TISSUE INTEGRITY - ADULT  Goal: Skin Integrity remains intact(Skin Breakdown Prevention)  Description: Assess:  -Perform Tam assessment every shift  -Clean and moisturize skin every shift  -Inspect skin when repositioning, toileting, and assisting with ADLS  -Assess under medical devices such as masimo every shift  -Assess extremities for adequate circulation and sensation     Bed Management:  -Have minimal linens on bed & keep smooth, unwrinkled  -Change linens as needed when moist or perspiring  -Avoid sitting or lying in one position for more than 2 hours while in bed  -Keep HOB at 30degrees     3/7/2024 1439 by Raphael Quinteros RN  Outcome: Adequate for Discharge  3/7/2024 1058 by Raphael Quinteros RN  Outcome: Progressing     Problem: MUSCULOSKELETAL - ADULT  Goal: Maintain or return mobility to safest level of function  Description: INTERVENTIONS:  - Assess patient's ability to carry out ADLs; assess patient's baseline for ADL function and identify physical deficits which impact ability to perform ADLs (bathing, care of mouth/teeth, toileting, grooming, dressing, etc.)  - Assess/evaluate cause of self-care deficits   - Assess range of motion  - Assess patient's mobility  - Assess patient's need for assistive devices  and provide as appropriate  - Encourage maximum independence but intervene and supervise when necessary  - Involve family in performance of ADLs  - Assess for home care needs following discharge   - Consider OT consult to assist with ADL evaluation and planning for discharge  - Provide patient education as appropriate  3/7/2024 1439 by Raphael Quinteros RN  Outcome: Adequate for Discharge  3/7/2024 1058 by Raphael Quinteros RN  Outcome: Progressing

## 2024-03-07 NOTE — H&P
Formerly [facility], later [facility]  H&P  Name: Jeni Gutierrez 73 y.o. female I MRN: [id number]  Unit/Bed#: [place] [address] MS [id number] I Date of Admission: 3/5/2024   Date of Service: 3/7/2024  Hospital Day: 0      Assessment/Plan   * Chronic venous insufficiency  Assessment & Plan  Patient presents with chronic bilateral lower extremity edema.    She had run out of her Lasix that she was taking for lower extremity edema    [place] to be in CHF.    Did restart her Lasix.  We also discussed that she is drinking way too much water and that is not benefiting her edema.    She was complaining of bilateral Baker's cyst and asked that they be drained.  Orthopedics saw her and did not feel she would benefit from drainage and it would be very risky.    Patient is going to see orthopedics as an outpatient for possible knee replacement in the next 2 weeks.    Patient is stable to go home.  I gave her prescriptions for Lasix that she had run out of.  We discussed she should be on a 1200 mL/day fluid restriction to try and keep the swelling under control.         Discharging Physician / Practitioner: Dakota Prieto DO  PCP: Maryan Yo MD  Admission Date:   Admission Orders (From admission, onward)       Ordered        03/05/24 0415  Place in Observation  Once                          Discharge Date: 03/07/24    Medical Problems       Resolved Problems  Date Reviewed: 3/5/2024   None           Consultations During Hospital Stay:  Orthopedics          Reason for Admission: Bilateral leg swelling      Hospital Course:     Jeni Gutierrez is a 73 y.o. female patient who originally presented to the hospital on 3/5/2024 due to bilateral leg swelling.  This is chronic for the patient.  She has chronic lower extremity edema.  She takes Lasix daily for this.  However she ran out of the Lasix.  She is also currently homeless and living in her car and she feels very cramped and there likely contributing to the lower extremity edema.  She was  "admitted placed back on Lasix.  She is drinking an excessive amount of water because she thought it was good for you to drink a lot of water.  But I did explain to her that this is counterproductive but were trying to get edema off with Lasix.  So I told her about a 1200 mL/day fluid restriction.  She is get a follow-up lab.  Gave her new prescriptions for Lasix.  She wanted her Baker's cyst drained, orthopedics saw her and felt it was too risky and would not be beneficial.  She unfortunately is still homeless and living in her car.  She is already on list for housing.  Is been about 4 months that she has been on the list and it could take up to a year to get housing.  Social work provided her with community services information.  Patient is okay to go home    Please see above list of diagnoses and related plan for additional information.       Condition at Discharge: stable       Discharge Day Visit / Exam:     Subjective:  feels about the same  She understands that she needs to drink less water.        Vitals: Blood Pressure: 155/74 (03/07/24 0816)  Pulse: 89 (03/07/24 0816)  Temperature: (!) 96.7 °F (35.9 °C) (03/07/24 0816)  Temp Source: Temporal (03/07/24 0816)  Respirations: 20 (03/07/24 0816)  Height: 5' 2\" (157.5 cm) (03/05/24 0642)  Weight - Scale: 110 kg (241 lb 13.5 oz) (03/07/24 0541)  SpO2: 96 % (03/07/24 0816)    Exam:     Physical Exam  Vitals and nursing note reviewed.   HENT:      Head: Normocephalic and atraumatic.   Eyes:      Pupils: Pupils are equal, round, and reactive to light.   Cardiovascular:      Rate and Rhythm: Normal rate and regular rhythm.      Heart sounds: No murmur heard.     No friction rub. No gallop.   Pulmonary:      Effort: Pulmonary effort is normal.      Breath sounds: Normal breath sounds. No wheezing or rales.   Abdominal:      General: Bowel sounds are normal.      Palpations: Abdomen is soft.      Tenderness: There is no abdominal tenderness.   Musculoskeletal:      " Right lower leg: Edema (1+) present.      Left lower leg: Edema (1+) present.       .         Discharge instructions/Information to patient and family:   See after visit summary for information provided to patient and family.      Provisions for Follow-Up Care:  See after visit summary for information related to follow-up care and any pertinent home health orders.      Disposition:     Home       Discharge Statement:  I spent 36 minutes discharging the patient. This time was spent on the day of discharge. I had direct contact with the patient on the day of discharge. Greater than 50% of the total time was spent examining patient, answering all patient questions, arranging and discussing plan of care with patient as well as directly providing post-discharge instructions.  Additional time then spent on discharge activities.    Discharge Medications:  See after visit summary for reconciled discharge medications provided to patient and family.      ** Please Note: This note has been constructed using a voice recognition system **

## 2024-06-06 ENCOUNTER — HOSPITAL ENCOUNTER (EMERGENCY)
Facility: HOSPITAL | Age: 74
Discharge: HOME | End: 2024-06-07
Attending: EMERGENCY MEDICINE
Payer: MEDICARE

## 2024-06-06 ENCOUNTER — APPOINTMENT (EMERGENCY)
Dept: RADIOLOGY | Facility: HOSPITAL | Age: 74
End: 2024-06-06
Payer: MEDICARE

## 2024-06-06 DIAGNOSIS — J18.9 PNA (PNEUMONIA): ICD-10-CM

## 2024-06-06 DIAGNOSIS — J40 BRONCHITIS: Primary | ICD-10-CM

## 2024-06-06 LAB
ALBUMIN SERPL BCP-MCNC: 3.8 G/DL (ref 3.5–5)
ALP SERPL-CCNC: 52 U/L (ref 34–104)
ALT SERPL W P-5'-P-CCNC: 9 U/L (ref 7–52)
ANION GAP SERPL CALCULATED.3IONS-SCNC: 9 MMOL/L (ref 4–13)
AST SERPL W P-5'-P-CCNC: 13 U/L (ref 13–39)
BASOPHILS # BLD AUTO: 0.03 THOUSANDS/ÂΜL (ref 0–0.1)
BASOPHILS NFR BLD AUTO: 1 % (ref 0–1)
BILIRUB SERPL-MCNC: 0.88 MG/DL (ref 0.2–1)
BUN SERPL-MCNC: 19 MG/DL (ref 5–25)
CALCIUM SERPL-MCNC: 9 MG/DL (ref 8.4–10.2)
CHLORIDE SERPL-SCNC: 100 MMOL/L (ref 96–108)
CO2 SERPL-SCNC: 29 MMOL/L (ref 21–32)
CREAT SERPL-MCNC: 1.23 MG/DL (ref 0.6–1.3)
EOSINOPHIL # BLD AUTO: 0.16 THOUSAND/ÂΜL (ref 0–0.61)
EOSINOPHIL NFR BLD AUTO: 3 % (ref 0–6)
ERYTHROCYTE [DISTWIDTH] IN BLOOD BY AUTOMATED COUNT: 14.3 % (ref 11.6–15.1)
FLUAV RNA RESP QL NAA+PROBE: NEGATIVE
FLUBV RNA RESP QL NAA+PROBE: NEGATIVE
GFR SERPL CREATININE-BSD FRML MDRD: 43 ML/MIN/1.73SQ M
GLUCOSE SERPL-MCNC: 96 MG/DL (ref 65–140)
HCT VFR BLD AUTO: 34.4 % (ref 34.8–46.1)
HGB BLD-MCNC: 11.5 G/DL (ref 11.5–15.4)
IMM GRANULOCYTES # BLD AUTO: 0.03 THOUSAND/UL (ref 0–0.2)
IMM GRANULOCYTES NFR BLD AUTO: 1 % (ref 0–2)
LYMPHOCYTES # BLD AUTO: 1.15 THOUSANDS/ÂΜL (ref 0.6–4.47)
LYMPHOCYTES NFR BLD AUTO: 20 % (ref 14–44)
MCH RBC QN AUTO: 28.2 PG (ref 26.8–34.3)
MCHC RBC AUTO-ENTMCNC: 33.4 G/DL (ref 31.4–37.4)
MCV RBC AUTO: 84 FL (ref 82–98)
MONOCYTES # BLD AUTO: 0.5 THOUSAND/ÂΜL (ref 0.17–1.22)
MONOCYTES NFR BLD AUTO: 9 % (ref 4–12)
NEUTROPHILS # BLD AUTO: 3.91 THOUSANDS/ÂΜL (ref 1.85–7.62)
NEUTS SEG NFR BLD AUTO: 66 % (ref 43–75)
NRBC BLD AUTO-RTO: 0 /100 WBCS
PLATELET # BLD AUTO: 177 THOUSANDS/UL (ref 149–390)
PMV BLD AUTO: 10 FL (ref 8.9–12.7)
POTASSIUM SERPL-SCNC: 3.1 MMOL/L (ref 3.5–5.3)
PROT SERPL-MCNC: 6.2 G/DL (ref 6.4–8.4)
RBC # BLD AUTO: 4.08 MILLION/UL (ref 3.81–5.12)
RSV RNA RESP QL NAA+PROBE: NEGATIVE
SARS-COV-2 RNA RESP QL NAA+PROBE: NEGATIVE
SODIUM SERPL-SCNC: 138 MMOL/L (ref 135–147)
WBC # BLD AUTO: 5.78 THOUSAND/UL (ref 4.31–10.16)

## 2024-06-06 PROCEDURE — 0241U HB NFCT DS VIR RESP RNA 4 TRGT: CPT | Performed by: EMERGENCY MEDICINE

## 2024-06-06 PROCEDURE — 94640 AIRWAY INHALATION TREATMENT: CPT

## 2024-06-06 PROCEDURE — 80053 COMPREHEN METABOLIC PANEL: CPT | Performed by: EMERGENCY MEDICINE

## 2024-06-06 PROCEDURE — 71046 X-RAY EXAM CHEST 2 VIEWS: CPT

## 2024-06-06 PROCEDURE — 85025 COMPLETE CBC W/AUTO DIFF WBC: CPT | Performed by: EMERGENCY MEDICINE

## 2024-06-06 PROCEDURE — 99285 EMERGENCY DEPT VISIT HI MDM: CPT

## 2024-06-06 PROCEDURE — 36415 COLL VENOUS BLD VENIPUNCTURE: CPT | Performed by: EMERGENCY MEDICINE

## 2024-06-06 PROCEDURE — 99285 EMERGENCY DEPT VISIT HI MDM: CPT | Performed by: EMERGENCY MEDICINE

## 2024-06-06 RX ORDER — CLONAZEPAM 0.5 MG/1
0.5 TABLET ORAL DAILY PRN
Status: DISCONTINUED | OUTPATIENT
Start: 2024-06-06 | End: 2024-06-07 | Stop reason: HOSPADM

## 2024-06-06 RX ORDER — FUROSEMIDE 20 MG/1
20 TABLET ORAL DAILY
Status: DISCONTINUED | OUTPATIENT
Start: 2024-06-07 | End: 2024-06-07 | Stop reason: HOSPADM

## 2024-06-06 RX ORDER — TRAMADOL HYDROCHLORIDE 50 MG/1
50 TABLET ORAL 2 TIMES DAILY PRN
Status: DISCONTINUED | OUTPATIENT
Start: 2024-06-06 | End: 2024-06-07 | Stop reason: HOSPADM

## 2024-06-06 RX ORDER — ALBUTEROL SULFATE 90 UG/1
2 AEROSOL, METERED RESPIRATORY (INHALATION) ONCE
Status: COMPLETED | OUTPATIENT
Start: 2024-06-06 | End: 2024-06-06

## 2024-06-06 RX ORDER — BUPROPION HYDROCHLORIDE 150 MG/1
150 TABLET ORAL DAILY
Status: DISCONTINUED | OUTPATIENT
Start: 2024-06-07 | End: 2024-06-07

## 2024-06-06 RX ORDER — HYDROCHLOROTHIAZIDE 12.5 MG/1
12.5 TABLET ORAL DAILY
Status: DISCONTINUED | OUTPATIENT
Start: 2024-06-07 | End: 2024-06-07 | Stop reason: HOSPADM

## 2024-06-06 RX ORDER — PREDNISONE 20 MG/1
40 TABLET ORAL ONCE
Status: COMPLETED | OUTPATIENT
Start: 2024-06-06 | End: 2024-06-06

## 2024-06-06 RX ORDER — PREDNISONE 20 MG/1
40 TABLET ORAL DAILY
Qty: 8 TABLET | Refills: 0 | Status: SHIPPED | OUTPATIENT
Start: 2024-06-06 | End: 2024-06-10

## 2024-06-06 RX ORDER — ROPINIROLE 1 MG/1
2 TABLET, FILM COATED ORAL
Status: DISCONTINUED | OUTPATIENT
Start: 2024-06-06 | End: 2024-06-07 | Stop reason: HOSPADM

## 2024-06-06 RX ADMIN — ALBUTEROL SULFATE 2 PUFF: 90 AEROSOL, METERED RESPIRATORY (INHALATION) at 23:03

## 2024-06-06 RX ADMIN — PREDNISONE 40 MG: 20 TABLET ORAL at 23:03

## 2024-06-06 RX ADMIN — IPRATROPIUM BROMIDE 0.5 MG: 0.5 SOLUTION RESPIRATORY (INHALATION) at 22:06

## 2024-06-06 RX ADMIN — ALBUTEROL SULFATE 5 MG: 2.5 SOLUTION RESPIRATORY (INHALATION) at 22:06

## 2024-06-07 VITALS
OXYGEN SATURATION: 90 % | TEMPERATURE: 98.5 F | HEART RATE: 72 BPM | SYSTOLIC BLOOD PRESSURE: 104 MMHG | RESPIRATION RATE: 19 BRPM | DIASTOLIC BLOOD PRESSURE: 51 MMHG

## 2024-06-07 RX ORDER — POTASSIUM CHLORIDE 20 MEQ/1
20 TABLET, EXTENDED RELEASE ORAL ONCE
Status: COMPLETED | OUTPATIENT
Start: 2024-06-07 | End: 2024-06-07

## 2024-06-07 RX ADMIN — POTASSIUM CHLORIDE 20 MEQ: 1500 TABLET, EXTENDED RELEASE ORAL at 09:26

## 2024-06-07 RX ADMIN — ROPINIROLE 2 MG: 1 TABLET, FILM COATED ORAL at 00:50

## 2024-06-07 RX ADMIN — HYDROCHLOROTHIAZIDE 12.5 MG: 12.5 TABLET ORAL at 09:26

## 2024-06-07 RX ADMIN — UMECLIDINIUM BROMIDE AND VILANTEROL TRIFENATATE 1 PUFF: 62.5; 25 POWDER RESPIRATORY (INHALATION) at 10:35

## 2024-06-07 RX ADMIN — APIXABAN 5 MG: 5 TABLET, FILM COATED ORAL at 00:50

## 2024-06-07 RX ADMIN — APIXABAN 5 MG: 5 TABLET, FILM COATED ORAL at 09:29

## 2024-06-07 RX ADMIN — FUROSEMIDE 20 MG: 20 TABLET ORAL at 09:28

## 2024-06-07 NOTE — ED NOTES
Case management was notified of consult.They are not in department yet but will be arriving shortly/  She ate all her breakfast . She has no complaints at this time      Tana Pinedo RN  06/07/24 102       Tana Pinedo RN  06/07/24 9035

## 2024-06-07 NOTE — ED NOTES
Pt is awake. She ambulated to the bathroom without difficulty. She is now resting in bed watching tv. No complaints at present time      Tana Pinedo RN  06/07/24 8442

## 2024-06-07 NOTE — CASE MANAGEMENT
"   Case Management ED Discharge Planning Note    Patient name Jeni Gutierrez  Location ED-25/ED-25 MRN [id number]  : 1950 Date 2024        OBJECTIVE:  Predictive Model Details          94%  Factor Value    Risk of Hospital Admission or ED Visit Model 40% Number of ED Visits 5+     12% Number of Hospitalizations 2     8% Is in Relationship No     6% Has Atrial Fibrillation Yes     6% Has COPD Yes     5% Has Anemia Yes     5% Has Medicare Yes     5% Has CVD Yes     5% Has Depression Yes     4% Has Asthma Yes     3% Has Chronic Liver Disease Yes     1% Has PCP Yes            Chief Complaint: Diarrhea, Cough .  Patient Class: Emergency  Preferred Pharmacy:   [facility] [address] [place] [id number]  Phone: [phone] Fax: [phone]    Primary Care Provider: Maryan Yo MD    Primary Insurance: MEDICARE  Secondary Insurance: AARP    ED Discharge Details:    Discharge planning discussed with:: Pt                                     Other Referral/Resources/Interventions Provided:  Interventions: Clothing  Referral Comments: Contacted [facility] per pts request                Additional Comments: SWCM introduced self and role at bedside per consult for \"medication.\" Pt is homeless and living in her car wo AC, and cannot afford her copays on a consistent basis. She will not go to shelters because she states she gets sick easily from staying in a congraget setting which led to \"8 hospitalizations\" last year. She is working with [facility] to address housing and other SDOH issues. Pt stated she is unable to pay her $29 copay for her medications and is requesting assistance. SWCM unable to provide assistance with copay after exploring available resources. After explaining to pt, she stated she instead needs assistance getting in contact with street medicine as they have previously paid her copays. Reached out to [facility] at [phone] and [facility]. Provided pt with clothing including a t-shirt, shorts, and sweatpants. CM available through [place].

## 2024-06-07 NOTE — CASE MANAGEMENT
SWCM attempted to engage pt at bedside per consult for medications. Pt is asleep and does not wake to verbal stimuli. Will re-attempt.    Augie SILVA MSW  ED case manager

## 2024-06-07 NOTE — ED NOTES
Pt is comfortably resting in room. She is sleeping with unlabored respirations.     Tana Pinedo RN  06/07/24 4296

## 2024-06-07 NOTE — ED NOTES
IV pulled, pt requesting a shower at this time, unable to assist with a shower since we dont have showers in the ER, pt set up to get washed up for the day, bathroom in room. Pt also asking for clothes, no clothes available in the ER, unless pt wants to wear paper scrubs.      Merlene Inman RN  06/07/24 4004

## 2024-06-07 NOTE — ED PROVIDER NOTES
History  Chief Complaint   Patient presents with    Diarrhea     Patient reports 3 episodes of diarrhea over the past week. Reports she is homeless and has been losing weight.     Cough     Also reports running nose x2 days and cough since last night.      74-year-old female who presents with viral URI type symptoms and diarrhea.  Over the past couple days, patient experiencing runny nose, congestion, cough.  Patient has also experienced 3 episodes of nonbloody, nonmelanotic diarrhea over the past week.        Prior to Admission Medications   Prescriptions Last Dose Informant Patient Reported? Taking?   Diclofenac Sodium (VOLTAREN) 1 %   No No   Sig: Apply 2 g topically 4 (four) times a day as needed (pain in the posterior aspects of the legs)   albuterol (Ventolin HFA) 90 mcg/act inhaler   No No   Sig: Inhale 2 puffs every 6 (six) hours as needed for wheezing   apixaban (ELIQUIS) 5 mg   Yes No   Sig: Take 5 mg by mouth 2 (two) times a day   buPROPion (WELLBUTRIN SR) 100 mg 12 hr tablet   Yes No   Sig: Take 100 mg by mouth 2 (two) times a day   buPROPion (WELLBUTRIN XL) 150 mg 24 hr tablet   No No   Sig: Take 1 tablet (150 mg total) by mouth daily   clonazePAM (KlonoPIN) 0.5 mg tablet   Yes No   Sig: Take 0.5 mg by mouth daily as needed   furosemide (LASIX) 20 mg tablet   No No   Sig: Take 1 tablet (20 mg total) by mouth daily   hydrochlorothiazide (HYDRODIURIL) 12.5 mg tablet   Yes No   Sig: Take 12.5 mg by mouth daily   pantoprazole (PROTONIX) 20 mg tablet   Yes No   Sig: Take 20 mg by mouth 2 (two) times a day   rOPINIRole (REQUIP) 1 mg tablet   Yes No   Sig: TAKE ONE TABLET BY MOUTH TWICE A DAY AND TAKE TWO TABLETS BY MOUTH EVERY DAY AT BEDTIME   traMADol (ULTRAM) 50 mg tablet   Yes No   Sig: Take 50 mg by mouth 2 (two) times a day as needed   umeclidinium-vilanterol 62.5-25 mcg/actuation inhaler   Yes No   Sig: Inhale 1 puff daily      Facility-Administered Medications: None       Past Medical History:    Diagnosis Date    Asthma     COPD (chronic obstructive pulmonary disease) (HCC)     Hypertension     Palpitations        Past Surgical History:   Procedure Laterality Date    BACK SURGERY      CHOLECYSTECTOMY      HYSTERECTOMY         History reviewed. No pertinent family history.  I have reviewed and agree with the history as documented.    E-Cigarette/Vaping    E-Cigarette Use Never User      E-Cigarette/Vaping Substances    Nicotine No     THC No     CBD No     Flavoring No     Other No     Unknown No      Social History     Tobacco Use    Smoking status: Former     Types: Cigarettes    Smokeless tobacco: Never    Tobacco comments:     Pt reports she quit smoking on 2/7/2024   Vaping Use    Vaping status: Never Used   Substance Use Topics    Alcohol use: Not Currently     Comment: 1 a month    Drug use: Never       Review of Systems   Constitutional:  Negative for chills and fever.   HENT:  Positive for congestion and rhinorrhea. Negative for sore throat and trouble swallowing.    Eyes:  Negative for photophobia and visual disturbance.   Respiratory:  Positive for cough. Negative for chest tightness and shortness of breath.    Cardiovascular:  Negative for chest pain, palpitations and leg swelling.   Gastrointestinal:  Positive for diarrhea. Negative for abdominal pain, blood in stool, nausea and vomiting.   Endocrine: Negative for polyuria.   Genitourinary:  Negative for dysuria, flank pain, hematuria, vaginal bleeding and vaginal discharge.   Musculoskeletal:  Negative for back pain and neck pain.   Skin:  Negative for color change and rash.   Allergic/Immunologic: Negative for immunocompromised state.   Neurological:  Negative for dizziness, weakness, light-headedness, numbness and headaches.   All other systems reviewed and are negative.      Physical Exam  Physical Exam  Vitals and nursing note reviewed.   Constitutional:       General: She is not in acute distress.     Appearance: She is well-developed.    HENT:      Head: Normocephalic and atraumatic.      Nose: Congestion present.      Mouth/Throat:      Lips: Pink.      Mouth: Mucous membranes are moist.   Eyes:      General: Lids are normal.      Extraocular Movements: Extraocular movements intact.      Conjunctiva/sclera: Conjunctivae normal.      Pupils: Pupils are equal, round, and reactive to light.   Cardiovascular:      Rate and Rhythm: Normal rate and regular rhythm.      Heart sounds: Normal heart sounds. No murmur heard.  Pulmonary:      Effort: Pulmonary effort is normal.      Breath sounds: Wheezing present.   Abdominal:      General: There is no distension.      Palpations: Abdomen is soft.      Tenderness: There is no abdominal tenderness. There is no guarding or rebound.   Musculoskeletal:         General: No swelling.      Cervical back: Full passive range of motion without pain, normal range of motion and neck supple.   Skin:     General: Skin is warm.      Capillary Refill: Capillary refill takes less than 2 seconds.   Neurological:      General: No focal deficit present.      Mental Status: She is alert.   Psychiatric:         Mood and Affect: Mood normal.         Speech: Speech normal.         Behavior: Behavior normal.         Vital Signs  ED Triage Vitals [06/06/24 2110]   Temperature Pulse Respirations Blood Pressure SpO2   98.5 °F (36.9 °C) 93 20 142/62 94 %      Temp Source Heart Rate Source Patient Position - Orthostatic VS BP Location FiO2 (%)   Oral Monitor Sitting Right arm --      Pain Score       --           Vitals:    06/06/24 2110 06/07/24 0234 06/07/24 0333   BP: 142/62 139/68 114/58   Pulse: 93 86 73   Patient Position - Orthostatic VS: Sitting Sitting Lying         Visual Acuity      ED Medications  Medications   apixaban (ELIQUIS) tablet 5 mg (5 mg Oral Given 6/7/24 0050)   buPROPion (WELLBUTRIN XL) 24 hr tablet 150 mg (has no administration in time range)   clonazePAM (KlonoPIN) tablet 0.5 mg (has no administration in time  range)   furosemide (LASIX) tablet 20 mg (has no administration in time range)   hydroCHLOROthiazide tablet 12.5 mg (has no administration in time range)   rOPINIRole (REQUIP) tablet 2 mg (2 mg Oral Given 6/7/24 0050)   traMADol (ULTRAM) tablet 50 mg (has no administration in time range)   umeclidinium-vilanterol 62.5-25 mcg/actuation inhaler 1 puff (has no administration in time range)   albuterol inhalation solution 5 mg (5 mg Nebulization Given 6/6/24 2206)   ipratropium (ATROVENT) 0.02 % inhalation solution 0.5 mg (0.5 mg Nebulization Given 6/6/24 2206)   albuterol (PROVENTIL HFA,VENTOLIN HFA) inhaler 2 puff (2 puffs Inhalation Given 6/6/24 2303)   predniSONE tablet 40 mg (40 mg Oral Given 6/6/24 2303)       Diagnostic Studies  Results Reviewed       Procedure Component Value Units Date/Time    CBC and differential [[id number]]  (Abnormal) Collected: 06/06/24 2214    Lab Status: Final result Specimen: Blood from Arm, Right Updated: 06/06/24 2255     WBC 5.78 Thousand/uL      RBC 4.08 Million/uL      Hemoglobin 11.5 g/dL      Hematocrit 34.4 %      MCV 84 fL      MCH 28.2 pg      MCHC 33.4 g/dL      RDW 14.3 %      MPV 10.0 fL      Platelets 177 Thousands/uL      nRBC 0 /100 WBCs      Segmented % 66 %      Immature Grans % 1 %      Lymphocytes % 20 %      Monocytes % 9 %      Eosinophils Relative 3 %      Basophils Relative 1 %      Absolute Neutrophils 3.91 Thousands/µL      Absolute Immature Grans 0.03 Thousand/uL      Absolute Lymphocytes 1.15 Thousands/µL      Absolute Monocytes 0.50 Thousand/µL      Eosinophils Absolute 0.16 Thousand/µL      Basophils Absolute 0.03 Thousands/µL     FLU/RSV/COVID - if FLU/RSV clinically relevant [[id number]]  (Normal) Collected: 06/06/24 2159    Lab Status: Final result Specimen: Nares from Nose Updated: 06/06/24 2244     SARS-CoV-2 Negative     INFLUENZA A PCR Negative     INFLUENZA B PCR Negative     RSV PCR Negative    Narrative:      FOR PEDIATRIC PATIENTS - copy/paste  COVID Guidelines URL to browser: [email or web address]    SARS-CoV-2 assay is a Nucleic Acid Amplification assay intended for the  qualitative detection of nucleic acid from SARS-CoV-2 in nasopharyngeal  swabs. Results are for the presumptive identification of SARS-CoV-2 RNA.    Positive results are indicative of infection with SARS-CoV-2, the virus  causing COVID-19, but do not rule out bacterial infection or co-infection  with other viruses. Laboratories within the [place] and its  territories are required to report all positive results to the appropriate  public health authorities. Negative results do not preclude SARS-CoV-2  infection and should not be used as the sole basis for treatment or other  patient management decisions. Negative results must be combined with  clinical observations, patient history, and epidemiological information.  This test has not been FDA cleared or approved.    This test has been authorized by FDA under an Emergency Use Authorization  (EUA). This test is only authorized for the duration of time the  declaration that circumstances exist justifying the authorization of the  emergency use of an in vitro diagnostic tests for detection of SARS-CoV-2  virus and/or diagnosis of COVID-19 infection under section 564(b)(1) of  the Act, 21 U.S.C. 360bbb-3(b)(1), unless the authorization is terminated  or revoked sooner. The test has been validated but independent review by FDA  and CLIA is pending.    Test performed using [employer] GeneXpert: This RT-PCR assay targets N2,  a region unique to SARS-CoV-2. A conserved region in the E-gene was chosen  for pan-Sarbecovirus detection which includes SARS-CoV-2.    According to CMS-2020-01-R, this platform meets the definition of high-throughput technology.    Comprehensive metabolic panel [[id number]]  (Abnormal) Collected: 06/06/24 4180    Lab Status: Final result Specimen: Blood from Arm, Right  Updated: 06/06/24 2235     Sodium 138 mmol/L      Potassium 3.1 mmol/L      Chloride 100 mmol/L      CO2 29 mmol/L      ANION GAP 9 mmol/L      BUN 19 mg/dL      Creatinine 1.23 mg/dL      Glucose 96 mg/dL      Calcium 9.0 mg/dL      AST 13 U/L      ALT 9 U/L      Alkaline Phosphatase 52 U/L      Total Protein 6.2 g/dL      Albumin 3.8 g/dL      Total Bilirubin 0.88 mg/dL      eGFR 43 ml/min/1.73sq m     Narrative:      National Kidney Disease Foundation guidelines for Chronic Kidney Disease (CKD):     Stage 1 with normal or high GFR (GFR > 90 mL/min/1.73 square meters)    Stage 2 Mild CKD (GFR = 60-89 mL/min/1.73 square meters)    Stage 3A Moderate CKD (GFR = 45-59 mL/min/1.73 square meters)    Stage 3B Moderate CKD (GFR = 30-44 mL/min/1.73 square meters)    Stage 4 Severe CKD (GFR = 15-29 mL/min/1.73 square meters)    Stage 5 End Stage CKD (GFR <15 mL/min/1.73 square meters)  Note: GFR calculation is accurate only with a steady state creatinine                   XR chest 2 views   ED Interpretation by Michel Moreland MD (06/07 0613)   No acute cardiopulmonary disease as interpreted by myself.                 Procedures  Procedures         ED Course                               SBIRT 20yo+      Flowsheet Row Most Recent Value   Initial Alcohol Screen: US AUDIT-C     1. How often do you have a drink containing alcohol? 0 Filed at: 06/06/2024 2109   2. How many drinks containing alcohol do you have on a typical day you are drinking?  0 Filed at: 06/06/2024 2109   3b. FEMALE Any Age, or MALE 65+: How often do you have 4 or more drinks on one occassion? 0 Filed at: 06/06/2024 2109   Audit-C Score 0 Filed at: 06/06/2024 2109   EL: How many times in the past year have you...    Used an illegal drug or used a prescription medication for non-medical reasons? Never Filed at: 06/06/2024 2109                      Medical Decision Making  Symptoms consistent with viral URI.  Doubt pneumonia.  Possibly bronchitis.  Only 3  episodes of diarrhea over the past week.  Unlikely infectious colitis.  -CBC to evaluate for evidence of marked leukocytosis or anemia.  -CMP to evaluate electrolytes and renal function.  -Flu/COVID/RSV to evaluate for viral cause.  -Chest x-ray to evaluate for pneumonia.  -5 mg albuterol/ 0.5 mg ipratropium breathing treatment for possible bronchitis.    Problems Addressed:  Bronchitis: self-limited or minor problem    Amount and/or Complexity of Data Reviewed  Labs: ordered.  Radiology: ordered and independent interpretation performed.    Risk  Prescription drug management.             Disposition  Final diagnoses:   Bronchitis     Time reflects when diagnosis was documented in both MDM as applicable and the Disposition within this note       Time User Action Codes Description Comment    6/6/2024 10:56 PM Michel Moreland Add [J40] Bronchitis           ED Disposition       ED Disposition   Discharge    Condition   Stable    Date/Time   Thu Jun 6, 2024 3972    Comment   Jeniclovis Gutierrez discharge to home/self care.                   Follow-up Information       Follow up With Specialties Details Why Contact Info Additional Information    Maryan Yo MD  Schedule an appointment as soon as possible for a visit   [address] [facility].  [address]  [facility] [address]  [phone]       [facility] Emergency Department Emergency Medicine Go to  If symptoms worsen [address] [place] [id number] [phone] [facility] Emergency Department, [address] [place] [address], [phone]            Patient's Medications   Discharge Prescriptions    PREDNISONE 20 MG TABLET    Take 2 tablets (40 mg total) by mouth daily for 4 days       Start Date: 6/6/2024  End Date: 6/10/2024       Order Dose: 40 mg       Quantity: 8 tablet    Refills: 0       No discharge procedures on file.    PDMP Review         Value Time User    PDMP Reviewed  Yes 10/4/2023  1:54 AM Jer Bravo PA-C             ED Provider  Electronically Signed by             Michel Moreland MD  06/06/24 2684       Michel Moreland MD  06/06/24 6501       Michel Moreland MD  06/07/24 4896

## 2024-06-11 RX ORDER — DOXYCYCLINE HYCLATE 100 MG/1
100 CAPSULE ORAL 2 TIMES DAILY
Qty: 10 CAPSULE | Refills: 0 | Status: SHIPPED | OUTPATIENT
Start: 2024-06-11 | End: 2024-06-16

## 2024-08-19 ENCOUNTER — HOSPITAL ENCOUNTER (EMERGENCY)
Facility: HOSPITAL | Age: 74
Discharge: HOME | End: 2024-08-19
Attending: EMERGENCY MEDICINE
Payer: MEDICARE

## 2024-08-19 VITALS
TEMPERATURE: 98.4 F | WEIGHT: 218.03 LBS | DIASTOLIC BLOOD PRESSURE: 69 MMHG | RESPIRATION RATE: 16 BRPM | OXYGEN SATURATION: 96 % | SYSTOLIC BLOOD PRESSURE: 138 MMHG | HEART RATE: 80 BPM | BODY MASS INDEX: 39.88 KG/M2

## 2024-08-19 DIAGNOSIS — M79.671 PAIN IN BOTH FEET: Primary | ICD-10-CM

## 2024-08-19 DIAGNOSIS — M79.605 PAIN IN BOTH LOWER EXTREMITIES: ICD-10-CM

## 2024-08-19 DIAGNOSIS — M79.672 PAIN IN BOTH FEET: Primary | ICD-10-CM

## 2024-08-19 DIAGNOSIS — M79.604 PAIN IN BOTH LOWER EXTREMITIES: ICD-10-CM

## 2024-08-19 LAB
ANION GAP SERPL CALCULATED.3IONS-SCNC: 6 MMOL/L (ref 4–13)
APTT PPP: 30 SECONDS (ref 23–34)
BACTERIA UR QL AUTO: ABNORMAL /HPF
BASOPHILS # BLD AUTO: 0.05 THOUSANDS/ÂΜL (ref 0–0.1)
BASOPHILS NFR BLD AUTO: 1 % (ref 0–1)
BILIRUB UR QL STRIP: NEGATIVE
BUN SERPL-MCNC: 20 MG/DL (ref 5–25)
CALCIUM SERPL-MCNC: 9 MG/DL (ref 8.4–10.2)
CHLORIDE SERPL-SCNC: 100 MMOL/L (ref 96–108)
CLARITY UR: ABNORMAL
CO2 SERPL-SCNC: 29 MMOL/L (ref 21–32)
COLOR UR: ABNORMAL
CREAT SERPL-MCNC: 0.92 MG/DL (ref 0.6–1.3)
EOSINOPHIL # BLD AUTO: 0.15 THOUSAND/ÂΜL (ref 0–0.61)
EOSINOPHIL NFR BLD AUTO: 2 % (ref 0–6)
ERYTHROCYTE [DISTWIDTH] IN BLOOD BY AUTOMATED COUNT: 14.1 % (ref 11.6–15.1)
GFR SERPL CREATININE-BSD FRML MDRD: 61 ML/MIN/1.73SQ M
GLUCOSE SERPL-MCNC: 90 MG/DL (ref 65–140)
GLUCOSE UR STRIP-MCNC: NEGATIVE MG/DL
HCT VFR BLD AUTO: 33.1 % (ref 34.8–46.1)
HGB BLD-MCNC: 11 G/DL (ref 11.5–15.4)
HGB UR QL STRIP.AUTO: ABNORMAL
IMM GRANULOCYTES # BLD AUTO: 0.01 THOUSAND/UL (ref 0–0.2)
IMM GRANULOCYTES NFR BLD AUTO: 0 % (ref 0–2)
INR PPP: 0.97 (ref 0.85–1.19)
KETONES UR STRIP-MCNC: NEGATIVE MG/DL
LEUKOCYTE ESTERASE UR QL STRIP: ABNORMAL
LYMPHOCYTES # BLD AUTO: 2.14 THOUSANDS/ÂΜL (ref 0.6–4.47)
LYMPHOCYTES NFR BLD AUTO: 34 % (ref 14–44)
MCH RBC QN AUTO: 28.8 PG (ref 26.8–34.3)
MCHC RBC AUTO-ENTMCNC: 33.2 G/DL (ref 31.4–37.4)
MCV RBC AUTO: 87 FL (ref 82–98)
MONOCYTES # BLD AUTO: 0.56 THOUSAND/ÂΜL (ref 0.17–1.22)
MONOCYTES NFR BLD AUTO: 9 % (ref 4–12)
MUCOUS THREADS UR QL AUTO: ABNORMAL
NEUTROPHILS # BLD AUTO: 3.39 THOUSANDS/ÂΜL (ref 1.85–7.62)
NEUTS SEG NFR BLD AUTO: 54 % (ref 43–75)
NITRITE UR QL STRIP: NEGATIVE
NON-SQ EPI CELLS URNS QL MICRO: ABNORMAL /HPF
NRBC BLD AUTO-RTO: 0 /100 WBCS
PH UR STRIP.AUTO: 6 [PH]
PLATELET # BLD AUTO: 165 THOUSANDS/UL (ref 149–390)
PMV BLD AUTO: 9.9 FL (ref 8.9–12.7)
POTASSIUM SERPL-SCNC: 3.6 MMOL/L (ref 3.5–5.3)
PROT UR STRIP-MCNC: NEGATIVE MG/DL
PROTHROMBIN TIME: 13.1 SECONDS (ref 12.3–15)
RBC # BLD AUTO: 3.82 MILLION/UL (ref 3.81–5.12)
RBC #/AREA URNS AUTO: ABNORMAL /HPF
SODIUM SERPL-SCNC: 135 MMOL/L (ref 135–147)
SP GR UR STRIP.AUTO: 1 (ref 1–1.03)
UROBILINOGEN UR STRIP-ACNC: <2 MG/DL
WBC # BLD AUTO: 6.3 THOUSAND/UL (ref 4.31–10.16)
WBC #/AREA URNS AUTO: ABNORMAL /HPF

## 2024-08-19 PROCEDURE — 85610 PROTHROMBIN TIME: CPT | Performed by: PHYSICIAN ASSISTANT

## 2024-08-19 PROCEDURE — 85730 THROMBOPLASTIN TIME PARTIAL: CPT | Performed by: PHYSICIAN ASSISTANT

## 2024-08-19 PROCEDURE — 80048 BASIC METABOLIC PNL TOTAL CA: CPT | Performed by: PHYSICIAN ASSISTANT

## 2024-08-19 PROCEDURE — 81001 URINALYSIS AUTO W/SCOPE: CPT | Performed by: PHYSICIAN ASSISTANT

## 2024-08-19 PROCEDURE — 36415 COLL VENOUS BLD VENIPUNCTURE: CPT | Performed by: PHYSICIAN ASSISTANT

## 2024-08-19 PROCEDURE — 96365 THER/PROPH/DIAG IV INF INIT: CPT

## 2024-08-19 PROCEDURE — 99283 EMERGENCY DEPT VISIT LOW MDM: CPT

## 2024-08-19 PROCEDURE — 85025 COMPLETE CBC W/AUTO DIFF WBC: CPT | Performed by: PHYSICIAN ASSISTANT

## 2024-08-19 PROCEDURE — 99284 EMERGENCY DEPT VISIT MOD MDM: CPT | Performed by: PHYSICIAN ASSISTANT

## 2024-08-19 RX ORDER — GABAPENTIN 300 MG/1
CAPSULE ORAL
Qty: 30 CAPSULE | Refills: 0 | Status: SHIPPED | OUTPATIENT
Start: 2024-08-19

## 2024-08-19 RX ORDER — GABAPENTIN 300 MG/1
300 CAPSULE ORAL ONCE
Status: COMPLETED | OUTPATIENT
Start: 2024-08-19 | End: 2024-08-19

## 2024-08-19 RX ORDER — ACETAMINOPHEN 10 MG/ML
1000 INJECTION, SOLUTION INTRAVENOUS ONCE
Status: COMPLETED | OUTPATIENT
Start: 2024-08-19 | End: 2024-08-19

## 2024-08-19 RX ADMIN — GABAPENTIN 300 MG: 300 CAPSULE ORAL at 07:39

## 2024-08-19 RX ADMIN — ACETAMINOPHEN 1000 MG: 10 INJECTION INTRAVENOUS at 06:45

## 2024-08-19 NOTE — ED PROVIDER NOTES
History  Chief Complaint   Patient presents with    Foot Pain     Pt states she has neuropathy in nabil feet, worse in left foot.  Pt states pain is worsening.      Pain to feet and into legs, has been oingoing but worse last night keeping her from sleeping.   Back surgery 1990  Sometimes gets pins and needles, sometimes can't keep the legs still - kt at night,.   Pt is homeless   Hasn't seen her dr for this, admits friend gave gabapentin - and this helped - but she hasn't taken it in a long time.   Hx arthritis to knees - has pain to her knees as well - this is not new.         Prior to Admission Medications   Prescriptions Last Dose Informant Patient Reported? Taking?   Diclofenac Sodium (VOLTAREN) 1 %   No Yes   Sig: Apply 2 g topically 4 (four) times a day as needed (pain in the posterior aspects of the legs)   albuterol (Ventolin HFA) 90 mcg/act inhaler   No Yes   Sig: Inhale 2 puffs every 6 (six) hours as needed for wheezing   apixaban (ELIQUIS) 5 mg   Yes Yes   Sig: Take 5 mg by mouth 2 (two) times a day   clonazePAM (KlonoPIN) 0.5 mg tablet   Yes Yes   Sig: Take 0.5 mg by mouth daily as needed   furosemide (LASIX) 20 mg tablet   No Yes   Sig: Take 1 tablet (20 mg total) by mouth daily   hydrochlorothiazide (HYDRODIURIL) 12.5 mg tablet   Yes Yes   Sig: Take 12.5 mg by mouth daily   pantoprazole (PROTONIX) 20 mg tablet   Yes No   Sig: Take 20 mg by mouth 2 (two) times a day   rOPINIRole (REQUIP) 1 mg tablet   Yes Yes   Sig: TAKE ONE TABLET BY MOUTH TWICE A DAY AND TAKE TWO TABLETS BY MOUTH EVERY DAY AT BEDTIME   traMADol (ULTRAM) 50 mg tablet   Yes Yes   Sig: Take 50 mg by mouth 2 (two) times a day as needed   umeclidinium-vilanterol 62.5-25 mcg/actuation inhaler   Yes Yes   Sig: Inhale 1 puff daily      Facility-Administered Medications: None       Past Medical History:   Diagnosis Date    Asthma     COPD (chronic obstructive pulmonary disease) (HCC)     Hypertension     Palpitations        Past Surgical  History:   Procedure Laterality Date    BACK SURGERY      CHOLECYSTECTOMY      HYSTERECTOMY         History reviewed. No pertinent family history.  I have reviewed and agree with the history as documented.    E-Cigarette/Vaping    E-Cigarette Use Never User      E-Cigarette/Vaping Substances    Nicotine No     THC No     CBD No     Flavoring No     Other No     Unknown No      Social History     Tobacco Use    Smoking status: Every Day     Current packs/day: 0.25     Types: Cigarettes    Smokeless tobacco: Never    Tobacco comments:     Pt reports she quit smoking on 2/7/2024   Vaping Use    Vaping status: Never Used   Substance Use Topics    Alcohol use: Not Currently     Comment: 1 a month    Drug use: Never       Review of Systems   Respiratory: Negative.     Cardiovascular: Negative.    Gastrointestinal: Negative.    Musculoskeletal:  Positive for myalgias.   All other systems reviewed and are negative.      Physical Exam  Physical Exam  Vitals and nursing note reviewed.   Constitutional:       Appearance: She is well-developed.      Comments: Sitting on edge of bed - moving legs restlessly and rubbing feet.    HENT:      Head: Normocephalic and atraumatic.      Right Ear: External ear normal.      Left Ear: External ear normal.   Eyes:      Conjunctiva/sclera: Conjunctivae normal.   Cardiovascular:      Rate and Rhythm: Normal rate and regular rhythm.      Heart sounds: Normal heart sounds.   Pulmonary:      Effort: Pulmonary effort is normal.      Breath sounds: Normal breath sounds.   Abdominal:      General: Bowel sounds are normal.      Palpations: Abdomen is soft.      Tenderness: There is no abdominal tenderness.   Musculoskeletal:         General: Normal range of motion.      Cervical back: Neck supple.   Lymphadenopathy:      Cervical: No cervical adenopathy.   Skin:     General: Skin is warm.      Findings: No rash.   Neurological:      Mental Status: She is alert.      Sensory: No sensory deficit.       Motor: No weakness.      Coordination: Coordination normal.   Psychiatric:         Behavior: Behavior normal.         Vital Signs  ED Triage Vitals [08/19/24 0523]   Temperature Pulse Respirations Blood Pressure SpO2   98.4 °F (36.9 °C) 80 16 138/69 96 %      Temp Source Heart Rate Source Patient Position - Orthostatic VS BP Location FiO2 (%)   Oral Monitor Sitting Right arm --      Pain Score       5           Vitals:    08/19/24 0523   BP: 138/69   Pulse: 80   Patient Position - Orthostatic VS: Sitting         Visual Acuity      ED Medications  Medications   acetaminophen (Ofirmev) injection 1,000 mg (0 mg Intravenous Stopped 8/19/24 0702)   gabapentin (NEURONTIN) capsule 300 mg (300 mg Oral Given 8/19/24 0739)       Diagnostic Studies  Results Reviewed       Procedure Component Value Units Date/Time    Urine Microscopic [[id number]]  (Abnormal) Collected: 08/19/24 0642    Lab Status: Final result Specimen: Urine, Clean Catch Updated: 08/19/24 0718     RBC, UA 1-2 /hpf      WBC, UA Innumerable /hpf      Epithelial Cells Occasional /hpf      Bacteria, UA Moderate /hpf      MUCUS THREADS Occasional    Basic metabolic panel [[id number]] Collected: 08/19/24 0635    Lab Status: Final result Specimen: Blood from Arm, Right Updated: 08/19/24 0708     Sodium 135 mmol/L      Potassium 3.6 mmol/L      Chloride 100 mmol/L      CO2 29 mmol/L      ANION GAP 6 mmol/L      BUN 20 mg/dL      Creatinine 0.92 mg/dL      Glucose 90 mg/dL      Calcium 9.0 mg/dL      eGFR 61 ml/min/1.73sq m     Narrative:      National Kidney Disease Foundation guidelines for Chronic Kidney Disease (CKD):     Stage 1 with normal or high GFR (GFR > 90 mL/min/1.73 square meters)    Stage 2 Mild CKD (GFR = 60-89 mL/min/1.73 square meters)    Stage 3A Moderate CKD (GFR = 45-59 mL/min/1.73 square meters)    Stage 3B Moderate CKD (GFR = 30-44 mL/min/1.73 square meters)    Stage 4 Severe CKD (GFR = 15-29 mL/min/1.73 square meters)    Stage 5 End Stage CKD  (GFR <15 mL/min/1.73 square meters)  Note: GFR calculation is accurate only with a steady state creatinine    UA (URINE) with reflex to Scope [[id number]]  (Abnormal) Collected: 08/19/24 0642    Lab Status: Final result Specimen: Urine, Clean Catch Updated: 08/19/24 0700     Color, UA Light Yellow     Clarity, UA Turbid     Specific Gravity, UA 1.005     pH, UA 6.0     Leukocytes, UA Large     Nitrite, UA Negative     Protein, UA Negative mg/dl      Glucose, UA Negative mg/dl      Ketones, UA Negative mg/dl      Urobilinogen, UA <2.0 mg/dl      Bilirubin, UA Negative     Occult Blood, UA Trace    Protime-INR [[id number]]  (Normal) Collected: 08/19/24 0635    Lab Status: Final result Specimen: Blood from Arm, Right Updated: 08/19/24 0659     Protime 13.1 seconds      INR 0.97    Narrative:      INR Therapeutic Range    Indication                                             INR Range      Atrial Fibrillation                                               2.0-3.0  Hypercoagulable State                                    2.0.2.3  Left Ventricular Asist Device                            2.0-3.0  Mechanical Heart Valve                                  -    Aortic(with afib, MI, embolism, HF, LA enlargement,    and/or coagulopathy)                                     2.0-3.0 (2.5-3.5)     Mitral                                                             2.5-3.5  Prosthetic/Bioprosthetic Heart Valve               2.0-3.0  Venous thromboembolism (VTE: VT, PE        2.0-3.0    APTT [[id number]]  (Normal) Collected: 08/19/24 0635    Lab Status: Final result Specimen: Blood from Arm, Right Updated: 08/19/24 0659     PTT 30 seconds     CBC and differential [[id number]]  (Abnormal) Collected: 08/19/24 0635    Lab Status: Final result Specimen: Blood from Arm, Right Updated: 08/19/24 0647     WBC 6.30 Thousand/uL      RBC 3.82 Million/uL      Hemoglobin 11.0 g/dL      Hematocrit 33.1 %      MCV 87 fL      MCH 28.8 pg      MCHC 33.2  g/dL      RDW 14.1 %      MPV 9.9 fL      Platelets 165 Thousands/uL      nRBC 0 /100 WBCs      Segmented % 54 %      Immature Grans % 0 %      Lymphocytes % 34 %      Monocytes % 9 %      Eosinophils Relative 2 %      Basophils Relative 1 %      Absolute Neutrophils 3.39 Thousands/µL      Absolute Immature Grans 0.01 Thousand/uL      Absolute Lymphocytes 2.14 Thousands/µL      Absolute Monocytes 0.56 Thousand/µL      Eosinophils Absolute 0.15 Thousand/µL      Basophils Absolute 0.05 Thousands/µL                    No orders to display              Procedures  Procedures         ED Course  ED Course as of 08/19/24 1519   Mon Aug 19, 2024   0705 Nitrite, UA: Negative  Large leuks - negative nitrates- awaiting additional info from lab to determine if UTI   0705 WBC: 6.30  normal   0705 Hemoglobin(!): 11.0  Hx anemia - improved from prior   0715 Potassium: 3.6  Normal    0717 Sodium: 135  normal   0717 GLUCOSE: 90  Normal                                  SBIRT 20yo+      Flowsheet Row Most Recent Value   Initial Alcohol Screen: US AUDIT-C     1. How often do you have a drink containing alcohol? 0 Filed at: 08/19/2024 0528   2. How many drinks containing alcohol do you have on a typical day you are drinking?  0 Filed at: 08/19/2024 0528   3a. Male UNDER 65: How often do you have five or more drinks on one occasion? 0 Filed at: 08/19/2024 0528   3b. FEMALE Any Age, or MALE 65+: How often do you have 4 or more drinks on one occassion? 0 Filed at: 08/19/2024 0528   Audit-C Score 0 Filed at: 08/19/2024 0528   EL: How many times in the past year have you...    Used an illegal drug or used a prescription medication for non-medical reasons? Never Filed at: 08/19/2024 0528                      Medical Decision Making  History of hypokalemia will get repeat labs.  No injury or trauma thing requiring imaging.    Amount and/or Complexity of Data Reviewed  External Data Reviewed: labs and notes.     Details: Hx   OA knees - sees  ortho   Hx hypo kalemia - will get labs   Labs: ordered. Decision-making details documented in ED Course.    Risk  Prescription drug management.  Risk Details: Had lengthy discussion about meds and need to have close follow up - discussed gabapentin and not to rapidly stop - and needs close follow up - she agrees to follow up                  Disposition  Final diagnoses:   Pain in both feet   Pain in both lower extremities     Time reflects when diagnosis was documented in both MDM as applicable and the Disposition within this note       Time User Action Codes Description Comment    8/19/2024  7:24 AM Amy Orta Add [M79.671,  M79.672] Pain in both feet     8/19/2024  7:24 AM Amy Orta Add [M79.604,  M79.605] Pain in both lower extremities           ED Disposition       ED Disposition   Discharge    Condition   Stable    Date/Time   Mon Aug 19, 2024 0724    Comment   Jeni Gutierrez discharge to home/self care.                   Follow-up Information       Follow up With Specialties Details Why Contact Info    Maryan Yo MD    [address] [facility].  [address]  [place] [address]  [phone]              Discharge Medication List as of 8/19/2024  7:26 AM        START taking these medications    Details   gabapentin (Neurontin) 300 mg capsule Take 1 tablet on day 1,  Then take 2 tablets on day 2, Then take 3 tablets on day 3 and every day after that as instructed by your doctor., Normal           CONTINUE these medications which have NOT CHANGED    Details   albuterol (Ventolin HFA) 90 mcg/act inhaler Inhale 2 puffs every 6 (six) hours as needed for wheezing, Starting Wed 10/4/2023, Normal      apixaban (ELIQUIS) 5 mg Take 5 mg by mouth 2 (two) times a day, Starting Wed 10/25/2023, Until Thu 10/24/2024, Historical Med      clonazePAM (KlonoPIN) 0.5 mg tablet Take 0.5 mg by mouth daily as needed, Starting Fri 8/18/2023, Historical Med      Diclofenac Sodium (VOLTAREN) 1 % Apply 2 g topically 4 (four) times  a day as needed (pain in the posterior aspects of the legs), Starting Thu 3/7/2024, Normal      furosemide (LASIX) 20 mg tablet Take 1 tablet (20 mg total) by mouth daily, Starting Thu 3/7/2024, Normal      hydrochlorothiazide (HYDRODIURIL) 12.5 mg tablet Take 12.5 mg by mouth daily, Starting Fri 7/7/2023, Historical Med      rOPINIRole (REQUIP) 1 mg tablet TAKE ONE TABLET BY MOUTH TWICE A DAY AND TAKE TWO TABLETS BY MOUTH EVERY DAY AT BEDTIME, Historical Med      traMADol (ULTRAM) 50 mg tablet Take 50 mg by mouth 2 (two) times a day as needed, Starting Fri 8/18/2023, Historical Med      umeclidinium-vilanterol 62.5-25 mcg/actuation inhaler Inhale 1 puff daily, Starting Wed 10/25/2023, Historical Med      pantoprazole (PROTONIX) 20 mg tablet Take 20 mg by mouth 2 (two) times a day, Starting Wed 4/26/2023, Until Thu 4/25/2024, Historical Med             No discharge procedures on file.    PDMP Review         Value Time User    PDMP Reviewed  Yes 10/4/2023  1:54 AM Jer Bravo PA-C            ED Provider  Electronically Signed by             Amy Orta PA-C  08/19/24 2952

## 2024-10-17 ENCOUNTER — HOSPITAL ENCOUNTER (EMERGENCY)
Facility: HOSPITAL | Age: 74
Discharge: HOME | End: 2024-10-17
Attending: EMERGENCY MEDICINE
Payer: MEDICARE

## 2024-10-17 VITALS
HEART RATE: 67 BPM | HEIGHT: 63 IN | OXYGEN SATURATION: 97 % | WEIGHT: 215.61 LBS | RESPIRATION RATE: 18 BRPM | SYSTOLIC BLOOD PRESSURE: 105 MMHG | TEMPERATURE: 98.3 F | BODY MASS INDEX: 38.2 KG/M2 | DIASTOLIC BLOOD PRESSURE: 52 MMHG

## 2024-10-17 DIAGNOSIS — Z59.00 HOMELESSNESS: ICD-10-CM

## 2024-10-17 DIAGNOSIS — E86.0 DEHYDRATION: ICD-10-CM

## 2024-10-17 DIAGNOSIS — R19.7 ACUTE DIARRHEA: Primary | ICD-10-CM

## 2024-10-17 LAB
ALBUMIN SERPL BCG-MCNC: 4 G/DL (ref 3.5–5)
ALP SERPL-CCNC: 56 U/L (ref 34–104)
ALT SERPL W P-5'-P-CCNC: 9 U/L (ref 7–52)
ANION GAP SERPL CALCULATED.3IONS-SCNC: 5 MMOL/L (ref 4–13)
AST SERPL W P-5'-P-CCNC: 12 U/L (ref 13–39)
BASOPHILS # BLD AUTO: 0.03 THOUSANDS/ΜL (ref 0–0.1)
BASOPHILS NFR BLD AUTO: 0 % (ref 0–1)
BILIRUB SERPL-MCNC: 0.51 MG/DL (ref 0.2–1)
BUN SERPL-MCNC: 23 MG/DL (ref 5–25)
CALCIUM SERPL-MCNC: 9 MG/DL (ref 8.4–10.2)
CHLORIDE SERPL-SCNC: 103 MMOL/L (ref 96–108)
CO2 SERPL-SCNC: 29 MMOL/L (ref 21–32)
CREAT SERPL-MCNC: 1.06 MG/DL (ref 0.6–1.3)
EOSINOPHIL # BLD AUTO: 0.1 THOUSAND/ΜL (ref 0–0.61)
EOSINOPHIL NFR BLD AUTO: 1 % (ref 0–6)
ERYTHROCYTE [DISTWIDTH] IN BLOOD BY AUTOMATED COUNT: 13.3 % (ref 11.6–15.1)
GFR SERPL CREATININE-BSD FRML MDRD: 51 ML/MIN/1.73SQ M
GLUCOSE SERPL-MCNC: 85 MG/DL (ref 65–140)
HCT VFR BLD AUTO: 35.2 % (ref 34.8–46.1)
HGB BLD-MCNC: 11.7 G/DL (ref 11.5–15.4)
IMM GRANULOCYTES # BLD AUTO: 0.03 THOUSAND/UL (ref 0–0.2)
IMM GRANULOCYTES NFR BLD AUTO: 0 % (ref 0–2)
LIPASE SERPL-CCNC: 12 U/L (ref 11–82)
LYMPHOCYTES # BLD AUTO: 1.78 THOUSANDS/ΜL (ref 0.6–4.47)
LYMPHOCYTES NFR BLD AUTO: 24 % (ref 14–44)
MCH RBC QN AUTO: 29.1 PG (ref 26.8–34.3)
MCHC RBC AUTO-ENTMCNC: 33.2 G/DL (ref 31.4–37.4)
MCV RBC AUTO: 88 FL (ref 82–98)
MONOCYTES # BLD AUTO: 0.83 THOUSAND/ΜL (ref 0.17–1.22)
MONOCYTES NFR BLD AUTO: 11 % (ref 4–12)
NEUTROPHILS # BLD AUTO: 4.59 THOUSANDS/ΜL (ref 1.85–7.62)
NEUTS SEG NFR BLD AUTO: 64 % (ref 43–75)
NRBC BLD AUTO-RTO: 0 /100 WBCS
PLATELET # BLD AUTO: 214 THOUSANDS/UL (ref 149–390)
PMV BLD AUTO: 10.1 FL (ref 8.9–12.7)
POTASSIUM SERPL-SCNC: 3.9 MMOL/L (ref 3.5–5.3)
PROT SERPL-MCNC: 6.5 G/DL (ref 6.4–8.4)
RBC # BLD AUTO: 4.02 MILLION/UL (ref 3.81–5.12)
SODIUM SERPL-SCNC: 137 MMOL/L (ref 135–147)
WBC # BLD AUTO: 7.36 THOUSAND/UL (ref 4.31–10.16)

## 2024-10-17 PROCEDURE — 85025 COMPLETE CBC W/AUTO DIFF WBC: CPT | Performed by: EMERGENCY MEDICINE

## 2024-10-17 PROCEDURE — 99284 EMERGENCY DEPT VISIT MOD MDM: CPT

## 2024-10-17 PROCEDURE — 36415 COLL VENOUS BLD VENIPUNCTURE: CPT | Performed by: EMERGENCY MEDICINE

## 2024-10-17 PROCEDURE — 96360 HYDRATION IV INFUSION INIT: CPT

## 2024-10-17 PROCEDURE — 83690 ASSAY OF LIPASE: CPT | Performed by: EMERGENCY MEDICINE

## 2024-10-17 PROCEDURE — 99284 EMERGENCY DEPT VISIT MOD MDM: CPT | Performed by: EMERGENCY MEDICINE

## 2024-10-17 PROCEDURE — 96361 HYDRATE IV INFUSION ADD-ON: CPT

## 2024-10-17 PROCEDURE — 80053 COMPREHEN METABOLIC PANEL: CPT | Performed by: EMERGENCY MEDICINE

## 2024-10-17 RX ORDER — GABAPENTIN 300 MG/1
300 CAPSULE ORAL ONCE
Status: COMPLETED | OUTPATIENT
Start: 2024-10-17 | End: 2024-10-17

## 2024-10-17 RX ORDER — HYOSCYAMINE SULFATE 0.125 MG
0.12 TABLET ORAL EVERY 4 HOURS PRN
Qty: 30 TABLET | Refills: 0 | Status: SHIPPED | OUTPATIENT
Start: 2024-10-17

## 2024-10-17 RX ADMIN — GABAPENTIN 300 MG: 300 CAPSULE ORAL at 16:07

## 2024-10-17 RX ADMIN — SODIUM CHLORIDE 1000 ML: 0.9 INJECTION, SOLUTION INTRAVENOUS at 15:13

## 2024-10-17 RX ADMIN — HYOSCYAMINE SULFATE 0.12 MG: 0.12 TABLET SUBLINGUAL at 16:08

## 2024-10-17 SDOH — ECONOMIC STABILITY - HOUSING INSECURITY: HOMELESSNESS UNSPECIFIED: Z59.00

## 2024-10-17 NOTE — ED PROVIDER NOTES
Time reflects when diagnosis was documented in both MDM as applicable and the Disposition within this note       Time User Action Codes Description Comment    10/17/2024  4:23 PM Gerard Latham Add [R19.7] Acute diarrhea     10/17/2024  4:23 PM Gerard Latham Add [E86.0] Dehydration     10/17/2024  4:23 PM Gerard Latham Add [Z59.00] Homelessness           ED Disposition       ED Disposition   Discharge    Condition   Stable    Date/Time   u Oct 17, 2024  4:23 PM    Comment   Jeni Oliviaabner discharge to home/self care.                   Assessment & Plan       Medical Decision Making  Diarrhea with benign abdominal exam-abdominal labs are pancreatitis and acute kidney injury, lecture abnormality, urine dip throughout urinary tract infection.  Given benign abdominal exam likelihood of acute surgical pathology is extremely unlikely and imaging is not indicated.  Will do IV fluids, treat symptoms, reassess.    Amount and/or Complexity of Data Reviewed  Labs: ordered.    Risk  Prescription drug management.        ED Course as of 10/17/24 1624   Thu Oct 17, 2024   1623 Medical workup is reviewed and benign.  Will reassure, , discharged to home.       Medications   sodium chloride 0.9 % bolus 1,000 mL (1,000 mL Intravenous New Bag 10/17/24 1513)   hyoscyamine (LEVSIN/SL) SL tablet 0.125 mg (0.125 mg Sublingual Given 10/17/24 1608)   gabapentin (NEURONTIN) capsule 300 mg (300 mg Oral Given 10/17/24 1607)       ED Risk Strat Scores                           SBIRT 20yo+      Flowsheet Row Most Recent Value   Initial Alcohol Screen: US AUDIT-C     1. How often do you have a drink containing alcohol? 0 Filed at: 10/17/2024 1514   2. How many drinks containing alcohol do you have on a typical day you are drinking?  0 Filed at: 10/17/2024 1514   3a. Male UNDER 65: How often do you have five or more drinks on one occasion? 0 Filed at: 10/17/2024 1514   3b. FEMALE Any Age, or MALE 65+: How often do you have 4 or more drinks on  "one occassion? 0 Filed at: 10/17/2024 1514   Audit-C Score 0 Filed at: 10/17/2024 1518   EL: How many times in the past year have you...    Used an illegal drug or used a prescription medication for non-medical reasons? Never Filed at: 10/17/2024 3914                            History of Present Illness       Chief Complaint   Patient presents with    Diarrhea     Diarrhea, tiredness, vomiting starting today, has had this issue \"for years\".       Past Medical History:   Diagnosis Date    Asthma     COPD (chronic obstructive pulmonary disease) (HCC)     Hypertension     Palpitations       Past Surgical History:   Procedure Laterality Date    BACK SURGERY      CHOLECYSTECTOMY      HYSTERECTOMY        History reviewed. No pertinent family history.   Social History     Tobacco Use    Smoking status: Every Day     Current packs/day: 0.25     Types: Cigarettes    Smokeless tobacco: Never    Tobacco comments:     Pt reports she quit smoking on 2/7/2024   Vaping Use    Vaping status: Never Used   Substance Use Topics    Alcohol use: Not Currently     Comment: 1 a month    Drug use: Never      E-Cigarette/Vaping    E-Cigarette Use Never User       E-Cigarette/Vaping Substances    Nicotine No     THC No     CBD No     Flavoring No     Other No     Unknown No       I have reviewed and agree with the history as documented.     74-year-old female presents for evaluation of 1 day of watery diarrhea.  States it started suddenly is without modifying factors.  Associated with nausea but no vomiting.  No abdominal pain, fevers, chills, cough neurosymptoms, urinary complaints and recent travel or antibiotics.      History provided by:  Patient  Diarrhea  Associated symptoms: no abdominal pain, no arthralgias, no chills, no fever and no vomiting        Review of Systems   Constitutional:  Negative for chills and fever.   HENT:  Negative for ear pain and sore throat.    Eyes:  Negative for pain and visual disturbance.   Respiratory:  " Negative for cough and shortness of breath.    Cardiovascular:  Negative for chest pain and palpitations.   Gastrointestinal:  Positive for diarrhea. Negative for abdominal pain and vomiting.   Genitourinary:  Negative for dysuria and hematuria.   Musculoskeletal:  Negative for arthralgias and back pain.   Skin:  Negative for color change and rash.   Neurological:  Negative for seizures and syncope.   All other systems reviewed and are negative.          Objective       ED Triage Vitals   Temperature Pulse Blood Pressure Respirations SpO2 Patient Position - Orthostatic VS   10/17/24 1413 10/17/24 1413 10/17/24 1413 10/17/24 1413 10/17/24 1413 10/17/24 1413   98.3 °F (36.8 °C) 87 140/66 18 97 % Sitting      Temp Source Heart Rate Source BP Location FiO2 (%) Pain Score    10/17/24 1413 10/17/24 1413 10/17/24 1413 -- 10/17/24 1608    Oral Monitor Right arm  No Pain      Vitals      Date and Time Temp Pulse SpO2 Resp BP Pain Score FACES Pain Rating User   10/17/24 1621 -- 67 97 % 18 105/52 -- -- MG   10/17/24 1608 -- -- -- -- -- No Pain -- LAB   10/17/24 1413 98.3 °F (36.8 °C) 87 97 % 18 140/66 -- -- DW            Physical Exam  Constitutional:       Appearance: She is well-developed.   HENT:      Head: Normocephalic and atraumatic.   Eyes:      Pupils: Pupils are equal, round, and reactive to light.   Neck:      Vascular: No JVD.      Trachea: No tracheal deviation.   Cardiovascular:      Rate and Rhythm: Normal rate and regular rhythm.   Pulmonary:      Effort: Pulmonary effort is normal. No tachypnea, accessory muscle usage or respiratory distress.      Breath sounds: Normal breath sounds.   Abdominal:      General: There is no distension.      Palpations: There is no mass.      Tenderness: There is no abdominal tenderness. There is no right CVA tenderness or left CVA tenderness.      Hernia: No hernia is present.   Musculoskeletal:      Right lower leg: Normal.      Left lower leg: Normal.   Skin:     General: Skin  is warm.      Capillary Refill: Capillary refill takes less than 2 seconds.   Neurological:      Mental Status: She is alert and oriented to person, place, and time.   Psychiatric:         Behavior: Behavior normal.         Results Reviewed       Procedure Component Value Units Date/Time    Comprehensive metabolic panel [[id number]]  (Abnormal) Collected: 10/17/24 1512    Lab Status: Final result Specimen: Blood from Arm, Right Updated: 10/17/24 1535     Sodium 137 mmol/L      Potassium 3.9 mmol/L      Chloride 103 mmol/L      CO2 29 mmol/L      ANION GAP 5 mmol/L      BUN 23 mg/dL      Creatinine 1.06 mg/dL      Glucose 85 mg/dL      Calcium 9.0 mg/dL      AST 12 U/L      ALT 9 U/L      Alkaline Phosphatase 56 U/L      Total Protein 6.5 g/dL      Albumin 4.0 g/dL      Total Bilirubin 0.51 mg/dL      eGFR 51 ml/min/1.73sq m     Narrative:      National Kidney Disease Foundation guidelines for Chronic Kidney Disease (CKD):     Stage 1 with normal or high GFR (GFR > 90 mL/min/1.73 square meters)    Stage 2 Mild CKD (GFR = 60-89 mL/min/1.73 square meters)    Stage 3A Moderate CKD (GFR = 45-59 mL/min/1.73 square meters)    Stage 3B Moderate CKD (GFR = 30-44 mL/min/1.73 square meters)    Stage 4 Severe CKD (GFR = 15-29 mL/min/1.73 square meters)    Stage 5 End Stage CKD (GFR <15 mL/min/1.73 square meters)  Note: GFR calculation is accurate only with a steady state creatinine    Lipase [[id number]]  (Normal) Collected: 10/17/24 1512    Lab Status: Final result Specimen: Blood from Arm, Right Updated: 10/17/24 1535     Lipase 12 u/L     CBC and differential [[id number]] Collected: 10/17/24 1512    Lab Status: Final result Specimen: Blood from Arm, Right Updated: 10/17/24 1519     WBC 7.36 Thousand/uL      RBC 4.02 Million/uL      Hemoglobin 11.7 g/dL      Hematocrit 35.2 %      MCV 88 fL      MCH 29.1 pg      MCHC 33.2 g/dL      RDW 13.3 %      MPV 10.1 fL      Platelets 214 Thousands/uL      nRBC 0 /100 WBCs      Segmented %  64 %      Immature Grans % 0 %      Lymphocytes % 24 %      Monocytes % 11 %      Eosinophils Relative 1 %      Basophils Relative 0 %      Absolute Neutrophils 4.59 Thousands/µL      Absolute Immature Grans 0.03 Thousand/uL      Absolute Lymphocytes 1.78 Thousands/µL      Absolute Monocytes 0.83 Thousand/µL      Eosinophils Absolute 0.10 Thousand/µL      Basophils Absolute 0.03 Thousands/µL             No orders to display       Procedures    ED Medication and Procedure Management   Prior to Admission Medications   Prescriptions Last Dose Informant Patient Reported? Taking?   Diclofenac Sodium (VOLTAREN) 1 %   No Yes   Sig: Apply 2 g topically 4 (four) times a day as needed (pain in the posterior aspects of the legs)   albuterol (Ventolin HFA) 90 mcg/act inhaler   No Yes   Sig: Inhale 2 puffs every 6 (six) hours as needed for wheezing   apixaban (ELIQUIS) 5 mg   Yes Yes   Sig: Take 5 mg by mouth 2 (two) times a day   clonazePAM (KlonoPIN) 0.5 mg tablet   Yes Yes   Sig: Take 0.5 mg by mouth daily as needed   furosemide (LASIX) 20 mg tablet   No Yes   Sig: Take 1 tablet (20 mg total) by mouth daily   Patient taking differently: Take 20 mg by mouth daily as needed   gabapentin (Neurontin) 300 mg capsule   No Yes   Sig: Take 1 tablet on day 1,  Then take 2 tablets on day 2, Then take 3 tablets on day 3 and every day after that as instructed by your doctor.   hydrochlorothiazide (HYDRODIURIL) 12.5 mg tablet   Yes Yes   Sig: Take 12.5 mg by mouth daily   pantoprazole (PROTONIX) 20 mg tablet   Yes No   Sig: Take 20 mg by mouth 2 (two) times a day   rOPINIRole (REQUIP) 1 mg tablet   Yes Yes   Sig: TAKE ONE TABLET BY MOUTH TWICE A DAY AND TAKE TWO TABLETS BY MOUTH EVERY DAY AT BEDTIME   traMADol (ULTRAM) 50 mg tablet   Yes Yes   Sig: Take 50 mg by mouth 2 (two) times a day as needed   umeclidinium-vilanterol 62.5-25 mcg/actuation inhaler   Yes Yes   Sig: Inhale 1 puff daily Anora      Facility-Administered Medications: None      Patient's Medications   Discharge Prescriptions    HYOSCYAMINE (ANASPAZ,LEVSIN) 0.125 MG TABLET    Take 1 tablet (0.125 mg total) by mouth every 4 (four) hours as needed for cramping       Start Date: 10/17/2024End Date: --       Order Dose: 0.125 mg       Quantity: 30 tablet    Refills: 0     No discharge procedures on file.  ED SEPSIS DOCUMENTATION   Time reflects when diagnosis was documented in both MDM as applicable and the Disposition within this note       Time User Action Codes Description Comment    10/17/2024  4:23 PM Gerard Latham [R19.7] Acute diarrhea     10/17/2024  4:23 PM Gerard Latham [E86.0] Dehydration     10/17/2024  4:23 PM Gerard Latham [Z59.00] Homelessness                  Gerard Latham MD  10/17/24 5837